# Patient Record
Sex: MALE | Race: WHITE | HISPANIC OR LATINO | URBAN - METROPOLITAN AREA
[De-identification: names, ages, dates, MRNs, and addresses within clinical notes are randomized per-mention and may not be internally consistent; named-entity substitution may affect disease eponyms.]

---

## 2018-03-11 ENCOUNTER — INPATIENT (INPATIENT)
Facility: HOSPITAL | Age: 43
LOS: 2 days | Discharge: HOME | DRG: 271 | End: 2018-03-14
Attending: STUDENT IN AN ORGANIZED HEALTH CARE EDUCATION/TRAINING PROGRAM | Admitting: INTERNAL MEDICINE
Payer: COMMERCIAL

## 2018-03-11 VITALS
OXYGEN SATURATION: 94 % | DIASTOLIC BLOOD PRESSURE: 63 MMHG | TEMPERATURE: 98 F | WEIGHT: 229.28 LBS | HEIGHT: 71 IN | HEART RATE: 74 BPM | RESPIRATION RATE: 17 BRPM | SYSTOLIC BLOOD PRESSURE: 99 MMHG

## 2018-03-11 DIAGNOSIS — R07.9 CHEST PAIN, UNSPECIFIED: ICD-10-CM

## 2018-03-11 PROCEDURE — 33967 INSERT I-AORT PERCUT DEVICE: CPT

## 2018-03-11 PROCEDURE — 93010 ELECTROCARDIOGRAM REPORT: CPT

## 2018-03-11 PROCEDURE — 92960 CARDIOVERSION ELECTRIC EXT: CPT

## 2018-03-11 PROCEDURE — 93458 L HRT ARTERY/VENTRICLE ANGIO: CPT | Mod: 26,59

## 2018-03-11 PROCEDURE — 76937 US GUIDE VASCULAR ACCESS: CPT | Mod: 26

## 2018-03-11 PROCEDURE — 92941 PRQ TRLML REVSC TOT OCCL AMI: CPT | Mod: LC

## 2018-03-11 PROCEDURE — 99152 MOD SED SAME PHYS/QHP 5/>YRS: CPT

## 2018-03-11 PROCEDURE — 92929: CPT | Mod: LC,NC

## 2018-03-11 RX ORDER — HEPARIN SODIUM 5000 [USP'U]/ML
INJECTION INTRAVENOUS; SUBCUTANEOUS
Qty: 25000 | Refills: 0 | Status: DISCONTINUED | OUTPATIENT
Start: 2018-03-11 | End: 2018-03-13

## 2018-03-11 RX ORDER — TICAGRELOR 90 MG/1
90 TABLET ORAL
Qty: 0 | Refills: 0 | Status: DISCONTINUED | OUTPATIENT
Start: 2018-03-11 | End: 2018-03-14

## 2018-03-11 RX ORDER — ASPIRIN/CALCIUM CARB/MAGNESIUM 324 MG
81 TABLET ORAL DAILY
Qty: 0 | Refills: 0 | Status: DISCONTINUED | OUTPATIENT
Start: 2018-03-11 | End: 2018-03-14

## 2018-03-11 RX ORDER — HEPARIN SODIUM 5000 [USP'U]/ML
4000 INJECTION INTRAVENOUS; SUBCUTANEOUS EVERY 6 HOURS
Qty: 0 | Refills: 0 | Status: DISCONTINUED | OUTPATIENT
Start: 2018-03-11 | End: 2018-03-13

## 2018-03-11 RX ORDER — ATORVASTATIN CALCIUM 80 MG/1
80 TABLET, FILM COATED ORAL AT BEDTIME
Qty: 0 | Refills: 0 | Status: DISCONTINUED | OUTPATIENT
Start: 2018-03-11 | End: 2018-03-14

## 2018-03-11 RX ADMIN — ATORVASTATIN CALCIUM 80 MILLIGRAM(S): 80 TABLET, FILM COATED ORAL at 23:39

## 2018-03-11 NOTE — H&P ADULT - NSHPREVIEWOFSYSTEMS_GEN_ALL_CORE
REVIEW OF SYSTEMS:    CONSTITUTIONAL: No weakness, fevers or chills  EYES/ENT: No visual changes;  No vertigo or throat pain   NECK: No pain or stiffness  RESPIRATORY: No cough, wheezing, hemoptysis; No shortness of breath  CARDIOVASCULAR: +1/10 CP. Denies palpitations  GASTROINTESTINAL: No abdominal or epigastric pain. No nausea, vomiting, or hematemesis; No diarrhea or constipation. No melena or hematochezia.  GENITOURINARY: No dysuria, frequency or hematuria  NEUROLOGICAL: No numbness or weakness  SKIN: No itching, rashes

## 2018-03-11 NOTE — H&P ADULT - FAMILY HISTORY
Father  Still living? Unknown  Family history of MI (myocardial infarction), Age at diagnosis: Age Unknown  Family history of diabetes mellitus, Age at diagnosis: Age Unknown

## 2018-03-11 NOTE — H&P ADULT - NSHPPHYSICALEXAM_GEN_ALL_CORE
General: WN/WD NAD.  Neurology: A&Ox3, nonfocal. Mentating well.   Respiratory: CTA B/L without wheezes, rales, rhonchi. Sating 96% on RA. Not tachypneic.  CV: RRR, S1S2, no murmurs, rubs or gallops. No JVD appreciated.   Abdominal: Soft, NT, ND +BS.   Extremities: No edema, + peripheral pulses  Lines: R groin IABP site soft without hematoma or bleeding. Distal extremity has palpable pulses and is warm to touch.

## 2018-03-11 NOTE — H&P ADULT - ASSESSMENT
42M PMH HLD and family hx of MI presented to OSH c/o 10/10 CP associated with diaphoresis and dyspnea, found to have SALLY in inferior leads with STD I, AVL, V1, V2. Pt was loaded with ASA and brilinta and transferred to [facility] for urgent PCI s/p ENOC x1 to pCirc and ENOC x1 to SHASHI assisted with IABP placement. During cath, pt went into hemodynamically unstable rhythm SVT with aberrancy vs VT despite lidocaine and amiodarone administration prompting sedation and cardioversion with return to sinus rhythm. LVEDP was 26. Pt admitted to CCU for continued management of STEMI.     #Neuro: A&O x3. No active issues.     #Resp: Sating well on RA. Lungs are CTA. CXR in AM. Monitor respiratory status.    #Cardiac:   1. STEMI:   -s/p PCI w/ IABP placement. IABP is 1:1 augmenting 90s w/ mean 70s  -EF was 35% LVEDP 26  -heparin gtt while IABP in place and daily CXRs to confirm placement  -DAPT, high intensity statin  -Holding off on BB for now in acute setting  -Holding off on ACE for now due to low augmenting pressure  -s/p cardioverson now in sinus rhythm and hemodynamically stable  -admission labs, A1C, lipid panel, TSH, pro BNP  -trend CE and EKGs  -Bedrest  -Eventual TTE  -Keep Mg>2 and K>4    #GI: No active issues. DASH diet.     #PPX: on heparin gtt. 42M PMH HLD and family hx of MI presented to OSH c/o 10/10 CP associated with diaphoresis and dyspnea, found to have SALLY in inferior leads with STD I, AVL, V1, V2. Pt was loaded with ASA and brilinta and transferred to [facility] for urgent PCI s/p ENOC x1 to pCirc and ENOC x1 to SHASHI assisted with IABP placement. During cath, pt went into hemodynamically unstable rhythm SVT with aberrancy vs VT despite lidocaine and amiodarone administration prompting sedation and cardioversion with return to sinus rhythm. LVEDP was 26. Pt admitted to CCU for continued management of STEMI.     #Neuro: A&O x3. No active issues.     #Resp: Sating well on RA. Lungs are CTA. CXR in AM. Monitor respiratory status.    #Cardiac:   1. STEMI:   -s/p PCI w/ IABP placement. IABP is 1:1 augmenting 90s w/ mean 70s  -EF was 35% LVEDP 26  -heparin gtt while IABP in place and daily CXRs to confirm placement  -DAPT, high intensity statin  -Holding off on BB for now in acute setting  -Holding off on ACE for now due to low augmenting pressure  -s/p cardioverson now in sinus rhythm and hemodynamically stable  -admission labs, A1C, lipid panel, TSH, pro BNP  -trend CE and EKGs  -Bedrest  -Eventual TTE  -Keep Mg>2 and K>4    #GI: No active issues. DASH diet. Transaminitis in the setting of MI, will trend.     #Renal: No active issues. Cr wnl.     #ID: No active issues, afebrile.     #PPX: on heparin gtt.

## 2018-03-11 NOTE — H&P ADULT - HISTORY OF PRESENT ILLNESS
Pt is a 41 yo M with PMH HLD(not on meds and has not seen a doctor in 5 years), family hx of MI, who presented to OSH c/o substernal 10/10 squeezing chest pain that began 1 hr prior to presentation. Pain radiated to left arm and was associated with diaphoresis and dyspnea. Pt endorses a few weeks of similar CP episodes precipitated by exertion at work and relieved with rest. Pt did not seek medical attention at that time. In ED at OSH EKG w/ SALLY inferior leads with STD I, AVL, V1, V2. Pt loaded with ASA and brilinta and transferred to [facility] for urgent PCI, cath lab activated. In cath lab pt loaded w/ heparin and found to have pCirc 100% DESx1, SHASHI 80% DESx1, EF 35% assisted with IABP placement in right groin due to slow flow in the SHASHI distally. During cath, pt went into hemodynamically unstable rhythm SVT with aberrancy vs VT despite lidocaine and amiodarone administration prompting sedation and cardioversion with return to sinus rhythm. LVEDP was 26. Pt admitted to CCU for continued management of STEMI.

## 2018-03-12 LAB
ALBUMIN SERPL ELPH-MCNC: 4 G/DL — SIGNIFICANT CHANGE UP (ref 3.3–5)
ALBUMIN SERPL ELPH-MCNC: 4.1 G/DL — SIGNIFICANT CHANGE UP (ref 3.3–5)
ALP SERPL-CCNC: 70 U/L — SIGNIFICANT CHANGE UP (ref 40–120)
ALP SERPL-CCNC: 72 U/L — SIGNIFICANT CHANGE UP (ref 40–120)
ALP SERPL-CCNC: 73 U/L — SIGNIFICANT CHANGE UP (ref 40–120)
ALP SERPL-CCNC: 76 U/L — SIGNIFICANT CHANGE UP (ref 40–120)
ALT FLD-CCNC: 103 U/L RC — HIGH (ref 10–45)
ALT FLD-CCNC: 88 U/L RC — HIGH (ref 10–45)
ALT FLD-CCNC: 89 U/L RC — HIGH (ref 10–45)
ALT FLD-CCNC: 98 U/L RC — HIGH (ref 10–45)
ANION GAP SERPL CALC-SCNC: 10 MMOL/L — SIGNIFICANT CHANGE UP (ref 5–17)
ANION GAP SERPL CALC-SCNC: 11 MMOL/L — SIGNIFICANT CHANGE UP (ref 5–17)
ANION GAP SERPL CALC-SCNC: 14 MMOL/L — SIGNIFICANT CHANGE UP (ref 5–17)
ANION GAP SERPL CALC-SCNC: 15 MMOL/L — SIGNIFICANT CHANGE UP (ref 5–17)
APTT BLD: 48.9 SEC — HIGH (ref 27.5–37.4)
APTT BLD: 49.5 SEC — HIGH (ref 27.5–37.4)
APTT BLD: > 200 SEC (ref 27.5–37.4)
AST SERPL-CCNC: 288 U/L — HIGH (ref 10–40)
AST SERPL-CCNC: 397 U/L — HIGH (ref 10–40)
AST SERPL-CCNC: 515 U/L — HIGH (ref 10–40)
AST SERPL-CCNC: 524 U/L — HIGH (ref 10–40)
BASOPHILS # BLD AUTO: 0 K/UL — SIGNIFICANT CHANGE UP (ref 0–0.2)
BASOPHILS NFR BLD AUTO: 0.2 % — SIGNIFICANT CHANGE UP (ref 0–2)
BASOPHILS NFR BLD AUTO: 0.3 % — SIGNIFICANT CHANGE UP (ref 0–2)
BASOPHILS NFR BLD AUTO: 0.3 % — SIGNIFICANT CHANGE UP (ref 0–2)
BASOPHILS NFR BLD AUTO: 0.4 % — SIGNIFICANT CHANGE UP (ref 0–2)
BILIRUB SERPL-MCNC: 1 MG/DL — SIGNIFICANT CHANGE UP (ref 0.2–1.2)
BILIRUB SERPL-MCNC: 1.3 MG/DL — HIGH (ref 0.2–1.2)
BILIRUB SERPL-MCNC: 1.4 MG/DL — HIGH (ref 0.2–1.2)
BILIRUB SERPL-MCNC: 1.5 MG/DL — HIGH (ref 0.2–1.2)
BLD GP AB SCN SERPL QL: NEGATIVE — SIGNIFICANT CHANGE UP
BUN SERPL-MCNC: 14 MG/DL — SIGNIFICANT CHANGE UP (ref 7–23)
BUN SERPL-MCNC: 15 MG/DL — SIGNIFICANT CHANGE UP (ref 7–23)
BUN SERPL-MCNC: 16 MG/DL — SIGNIFICANT CHANGE UP (ref 7–23)
BUN SERPL-MCNC: 16 MG/DL — SIGNIFICANT CHANGE UP (ref 7–23)
CALCIUM SERPL-MCNC: 8.8 MG/DL — SIGNIFICANT CHANGE UP (ref 8.4–10.5)
CALCIUM SERPL-MCNC: 9 MG/DL — SIGNIFICANT CHANGE UP (ref 8.4–10.5)
CALCIUM SERPL-MCNC: 9 MG/DL — SIGNIFICANT CHANGE UP (ref 8.4–10.5)
CALCIUM SERPL-MCNC: 9.1 MG/DL — SIGNIFICANT CHANGE UP (ref 8.4–10.5)
CHLORIDE SERPL-SCNC: 101 MMOL/L — SIGNIFICANT CHANGE UP (ref 96–108)
CHLORIDE SERPL-SCNC: 102 MMOL/L — SIGNIFICANT CHANGE UP (ref 96–108)
CHLORIDE SERPL-SCNC: 102 MMOL/L — SIGNIFICANT CHANGE UP (ref 96–108)
CHLORIDE SERPL-SCNC: 103 MMOL/L — SIGNIFICANT CHANGE UP (ref 96–108)
CHOLEST SERPL-MCNC: 198 MG/DL — SIGNIFICANT CHANGE UP (ref 10–199)
CK MB BLD-MCNC: 2.9 % — SIGNIFICANT CHANGE UP (ref 0–3.5)
CK MB BLD-MCNC: 4.1 % — HIGH (ref 0–3.5)
CK MB CFR SERPL CALC: 110.9 NG/ML — HIGH (ref 0–6.7)
CK MB CFR SERPL CALC: 162.5 NG/ML — HIGH (ref 0–6.7)
CK MB CFR SERPL CALC: 186.6 NG/ML — HIGH (ref 0–6.7)
CK SERPL-CCNC: 3545 U/L — HIGH (ref 30–200)
CK SERPL-CCNC: 4606 U/L — HIGH (ref 30–200)
CK SERPL-CCNC: 5545 U/L — HIGH (ref 30–200)
CO2 SERPL-SCNC: 23 MMOL/L — SIGNIFICANT CHANGE UP (ref 22–31)
CO2 SERPL-SCNC: 23 MMOL/L — SIGNIFICANT CHANGE UP (ref 22–31)
CO2 SERPL-SCNC: 25 MMOL/L — SIGNIFICANT CHANGE UP (ref 22–31)
CO2 SERPL-SCNC: 25 MMOL/L — SIGNIFICANT CHANGE UP (ref 22–31)
CREAT SERPL-MCNC: 0.82 MG/DL — SIGNIFICANT CHANGE UP (ref 0.5–1.3)
CREAT SERPL-MCNC: 0.83 MG/DL — SIGNIFICANT CHANGE UP (ref 0.5–1.3)
CREAT SERPL-MCNC: 0.87 MG/DL — SIGNIFICANT CHANGE UP (ref 0.5–1.3)
CREAT SERPL-MCNC: 0.95 MG/DL — SIGNIFICANT CHANGE UP (ref 0.5–1.3)
EOSINOPHIL # BLD AUTO: 0 K/UL — SIGNIFICANT CHANGE UP (ref 0–0.5)
EOSINOPHIL NFR BLD AUTO: 0.1 % — SIGNIFICANT CHANGE UP (ref 0–6)
EOSINOPHIL NFR BLD AUTO: 0.1 % — SIGNIFICANT CHANGE UP (ref 0–6)
EOSINOPHIL NFR BLD AUTO: 0.2 % — SIGNIFICANT CHANGE UP (ref 0–6)
EOSINOPHIL NFR BLD AUTO: 0.5 % — SIGNIFICANT CHANGE UP (ref 0–6)
GLUCOSE SERPL-MCNC: 117 MG/DL — HIGH (ref 70–99)
GLUCOSE SERPL-MCNC: 128 MG/DL — HIGH (ref 70–99)
GLUCOSE SERPL-MCNC: 129 MG/DL — HIGH (ref 70–99)
GLUCOSE SERPL-MCNC: 155 MG/DL — HIGH (ref 70–99)
HBA1C BLD-MCNC: 5.6 % — SIGNIFICANT CHANGE UP (ref 4–5.6)
HCT VFR BLD CALC: 37.8 % — LOW (ref 39–50)
HCT VFR BLD CALC: 38.2 % — LOW (ref 39–50)
HCT VFR BLD CALC: 39.1 % — SIGNIFICANT CHANGE UP (ref 39–50)
HCT VFR BLD CALC: 39.9 % — SIGNIFICANT CHANGE UP (ref 39–50)
HDLC SERPL-MCNC: 40 MG/DL — SIGNIFICANT CHANGE UP (ref 40–125)
HGB BLD-MCNC: 13 G/DL — SIGNIFICANT CHANGE UP (ref 13–17)
HGB BLD-MCNC: 13.1 G/DL — SIGNIFICANT CHANGE UP (ref 13–17)
HGB BLD-MCNC: 13.2 G/DL — SIGNIFICANT CHANGE UP (ref 13–17)
HGB BLD-MCNC: 13.2 G/DL — SIGNIFICANT CHANGE UP (ref 13–17)
INR BLD: 1.02 RATIO — SIGNIFICANT CHANGE UP (ref 0.88–1.16)
INR BLD: 1.09 RATIO — SIGNIFICANT CHANGE UP (ref 0.88–1.16)
LIPID PNL WITH DIRECT LDL SERPL: 136 MG/DL — HIGH
LYMPHOCYTES # BLD AUTO: 1.3 K/UL — SIGNIFICANT CHANGE UP (ref 1–3.3)
LYMPHOCYTES # BLD AUTO: 1.6 K/UL — SIGNIFICANT CHANGE UP (ref 1–3.3)
LYMPHOCYTES # BLD AUTO: 1.9 K/UL — SIGNIFICANT CHANGE UP (ref 1–3.3)
LYMPHOCYTES # BLD AUTO: 10.6 % — LOW (ref 13–44)
LYMPHOCYTES # BLD AUTO: 12 % — LOW (ref 13–44)
LYMPHOCYTES # BLD AUTO: 17.3 % — SIGNIFICANT CHANGE UP (ref 13–44)
LYMPHOCYTES # BLD AUTO: 19.6 % — SIGNIFICANT CHANGE UP (ref 13–44)
LYMPHOCYTES # BLD AUTO: 2.2 K/UL — SIGNIFICANT CHANGE UP (ref 1–3.3)
MAGNESIUM SERPL-MCNC: 1.9 MG/DL — SIGNIFICANT CHANGE UP (ref 1.6–2.6)
MAGNESIUM SERPL-MCNC: 2.2 MG/DL — SIGNIFICANT CHANGE UP (ref 1.6–2.6)
MCHC RBC-ENTMCNC: 28.5 PG — SIGNIFICANT CHANGE UP (ref 27–34)
MCHC RBC-ENTMCNC: 29.6 PG — SIGNIFICANT CHANGE UP (ref 27–34)
MCHC RBC-ENTMCNC: 30 PG — SIGNIFICANT CHANGE UP (ref 27–34)
MCHC RBC-ENTMCNC: 30.1 PG — SIGNIFICANT CHANGE UP (ref 27–34)
MCHC RBC-ENTMCNC: 32.9 GM/DL — SIGNIFICANT CHANGE UP (ref 32–36)
MCHC RBC-ENTMCNC: 33.8 GM/DL — SIGNIFICANT CHANGE UP (ref 32–36)
MCHC RBC-ENTMCNC: 34.4 GM/DL — SIGNIFICANT CHANGE UP (ref 32–36)
MCHC RBC-ENTMCNC: 34.7 GM/DL — SIGNIFICANT CHANGE UP (ref 32–36)
MCV RBC AUTO: 86.7 FL — SIGNIFICANT CHANGE UP (ref 80–100)
MCV RBC AUTO: 86.8 FL — SIGNIFICANT CHANGE UP (ref 80–100)
MCV RBC AUTO: 87.3 FL — SIGNIFICANT CHANGE UP (ref 80–100)
MCV RBC AUTO: 87.3 FL — SIGNIFICANT CHANGE UP (ref 80–100)
MONOCYTES # BLD AUTO: 0.3 K/UL — SIGNIFICANT CHANGE UP (ref 0–0.9)
MONOCYTES # BLD AUTO: 0.4 K/UL — SIGNIFICANT CHANGE UP (ref 0–0.9)
MONOCYTES # BLD AUTO: 0.8 K/UL — SIGNIFICANT CHANGE UP (ref 0–0.9)
MONOCYTES # BLD AUTO: 0.9 K/UL — SIGNIFICANT CHANGE UP (ref 0–0.9)
MONOCYTES NFR BLD AUTO: 3.2 % — SIGNIFICANT CHANGE UP (ref 2–14)
MONOCYTES NFR BLD AUTO: 3.7 % — SIGNIFICANT CHANGE UP (ref 2–14)
MONOCYTES NFR BLD AUTO: 5.9 % — SIGNIFICANT CHANGE UP (ref 2–14)
MONOCYTES NFR BLD AUTO: 7.2 % — SIGNIFICANT CHANGE UP (ref 2–14)
NEUTROPHILS # BLD AUTO: 10.1 K/UL — HIGH (ref 1.8–7.4)
NEUTROPHILS # BLD AUTO: 11.3 K/UL — HIGH (ref 1.8–7.4)
NEUTROPHILS # BLD AUTO: 7.3 K/UL — SIGNIFICANT CHANGE UP (ref 1.8–7.4)
NEUTROPHILS # BLD AUTO: 9.5 K/UL — HIGH (ref 1.8–7.4)
NEUTROPHILS NFR BLD AUTO: 74.9 % — SIGNIFICANT CHANGE UP (ref 43–77)
NEUTROPHILS NFR BLD AUTO: 76.3 % — SIGNIFICANT CHANGE UP (ref 43–77)
NEUTROPHILS NFR BLD AUTO: 81.8 % — HIGH (ref 43–77)
NEUTROPHILS NFR BLD AUTO: 85.2 % — HIGH (ref 43–77)
PHOSPHATE SERPL-MCNC: 1.9 MG/DL — LOW (ref 2.5–4.5)
PHOSPHATE SERPL-MCNC: 2 MG/DL — LOW (ref 2.5–4.5)
PHOSPHATE SERPL-MCNC: 2.6 MG/DL — SIGNIFICANT CHANGE UP (ref 2.5–4.5)
PHOSPHATE SERPL-MCNC: 2.9 MG/DL — SIGNIFICANT CHANGE UP (ref 2.5–4.5)
PLATELET # BLD AUTO: 150 K/UL — SIGNIFICANT CHANGE UP (ref 150–400)
PLATELET # BLD AUTO: 165 K/UL — SIGNIFICANT CHANGE UP (ref 150–400)
PLATELET # BLD AUTO: 168 K/UL — SIGNIFICANT CHANGE UP (ref 150–400)
PLATELET # BLD AUTO: 178 K/UL — SIGNIFICANT CHANGE UP (ref 150–400)
POTASSIUM SERPL-MCNC: 3.7 MMOL/L — SIGNIFICANT CHANGE UP (ref 3.5–5.3)
POTASSIUM SERPL-MCNC: 3.7 MMOL/L — SIGNIFICANT CHANGE UP (ref 3.5–5.3)
POTASSIUM SERPL-MCNC: 3.8 MMOL/L — SIGNIFICANT CHANGE UP (ref 3.5–5.3)
POTASSIUM SERPL-MCNC: 3.9 MMOL/L — SIGNIFICANT CHANGE UP (ref 3.5–5.3)
POTASSIUM SERPL-SCNC: 3.7 MMOL/L — SIGNIFICANT CHANGE UP (ref 3.5–5.3)
POTASSIUM SERPL-SCNC: 3.7 MMOL/L — SIGNIFICANT CHANGE UP (ref 3.5–5.3)
POTASSIUM SERPL-SCNC: 3.8 MMOL/L — SIGNIFICANT CHANGE UP (ref 3.5–5.3)
POTASSIUM SERPL-SCNC: 3.9 MMOL/L — SIGNIFICANT CHANGE UP (ref 3.5–5.3)
PROT SERPL-MCNC: 7.2 G/DL — SIGNIFICANT CHANGE UP (ref 6–8.3)
PROT SERPL-MCNC: 7.5 G/DL — SIGNIFICANT CHANGE UP (ref 6–8.3)
PROT SERPL-MCNC: 7.6 G/DL — SIGNIFICANT CHANGE UP (ref 6–8.3)
PROT SERPL-MCNC: 7.6 G/DL — SIGNIFICANT CHANGE UP (ref 6–8.3)
PROTHROM AB SERPL-ACNC: 11 SEC — SIGNIFICANT CHANGE UP (ref 9.8–12.7)
PROTHROM AB SERPL-ACNC: 11.8 SEC — SIGNIFICANT CHANGE UP (ref 9.8–12.7)
RBC # BLD: 4.33 M/UL — SIGNIFICANT CHANGE UP (ref 4.2–5.8)
RBC # BLD: 4.4 M/UL — SIGNIFICANT CHANGE UP (ref 4.2–5.8)
RBC # BLD: 4.48 M/UL — SIGNIFICANT CHANGE UP (ref 4.2–5.8)
RBC # BLD: 4.6 M/UL — SIGNIFICANT CHANGE UP (ref 4.2–5.8)
RBC # FLD: 12.7 % — SIGNIFICANT CHANGE UP (ref 10.3–14.5)
RBC # FLD: 12.7 % — SIGNIFICANT CHANGE UP (ref 10.3–14.5)
RBC # FLD: 12.8 % — SIGNIFICANT CHANGE UP (ref 10.3–14.5)
RBC # FLD: 12.8 % — SIGNIFICANT CHANGE UP (ref 10.3–14.5)
RH IG SCN BLD-IMP: POSITIVE — SIGNIFICANT CHANGE UP
SODIUM SERPL-SCNC: 138 MMOL/L — SIGNIFICANT CHANGE UP (ref 135–145)
SODIUM SERPL-SCNC: 138 MMOL/L — SIGNIFICANT CHANGE UP (ref 135–145)
SODIUM SERPL-SCNC: 139 MMOL/L — SIGNIFICANT CHANGE UP (ref 135–145)
SODIUM SERPL-SCNC: 139 MMOL/L — SIGNIFICANT CHANGE UP (ref 135–145)
TOTAL CHOLESTEROL/HDL RATIO MEASUREMENT: 5 RATIO — SIGNIFICANT CHANGE UP (ref 3.4–9.6)
TRIGL SERPL-MCNC: 112 MG/DL — SIGNIFICANT CHANGE UP (ref 10–149)
TROPONIN T SERPL-MCNC: 15.67 NG/ML — HIGH (ref 0–0.06)
TROPONIN T SERPL-MCNC: 5.85 NG/ML — HIGH (ref 0–0.06)
TROPONIN T SERPL-MCNC: 6.69 NG/ML — HIGH (ref 0–0.06)
TSH SERPL-MCNC: 1.06 UIU/ML — SIGNIFICANT CHANGE UP (ref 0.27–4.2)
WBC # BLD: 11.8 K/UL — HIGH (ref 3.8–10.5)
WBC # BLD: 12.6 K/UL — HIGH (ref 3.8–10.5)
WBC # BLD: 13.8 K/UL — HIGH (ref 3.8–10.5)
WBC # BLD: 9.6 K/UL — SIGNIFICANT CHANGE UP (ref 3.8–10.5)
WBC # FLD AUTO: 11.8 K/UL — HIGH (ref 3.8–10.5)
WBC # FLD AUTO: 12.6 K/UL — HIGH (ref 3.8–10.5)
WBC # FLD AUTO: 13.8 K/UL — HIGH (ref 3.8–10.5)
WBC # FLD AUTO: 9.6 K/UL — SIGNIFICANT CHANGE UP (ref 3.8–10.5)

## 2018-03-12 PROCEDURE — 93306 TTE W/DOPPLER COMPLETE: CPT | Mod: 26

## 2018-03-12 PROCEDURE — 99291 CRITICAL CARE FIRST HOUR: CPT

## 2018-03-12 PROCEDURE — 93010 ELECTROCARDIOGRAM REPORT: CPT

## 2018-03-12 PROCEDURE — 71045 X-RAY EXAM CHEST 1 VIEW: CPT | Mod: 26

## 2018-03-12 RX ORDER — METOPROLOL TARTRATE 50 MG
12.5 TABLET ORAL ONCE
Qty: 0 | Refills: 0 | Status: COMPLETED | OUTPATIENT
Start: 2018-03-12 | End: 2018-03-12

## 2018-03-12 RX ORDER — POTASSIUM CHLORIDE 20 MEQ
20 PACKET (EA) ORAL ONCE
Qty: 0 | Refills: 0 | Status: COMPLETED | OUTPATIENT
Start: 2018-03-12 | End: 2018-03-12

## 2018-03-12 RX ORDER — MAGNESIUM SULFATE 500 MG/ML
1 VIAL (ML) INJECTION ONCE
Qty: 0 | Refills: 0 | Status: COMPLETED | OUTPATIENT
Start: 2018-03-12 | End: 2018-03-12

## 2018-03-12 RX ORDER — LANOLIN ALCOHOL/MO/W.PET/CERES
1 CREAM (GRAM) TOPICAL AT BEDTIME
Qty: 0 | Refills: 0 | Status: DISCONTINUED | OUTPATIENT
Start: 2018-03-12 | End: 2018-03-14

## 2018-03-12 RX ORDER — FUROSEMIDE 40 MG
20 TABLET ORAL ONCE
Qty: 0 | Refills: 0 | Status: COMPLETED | OUTPATIENT
Start: 2018-03-12 | End: 2018-03-12

## 2018-03-12 RX ORDER — POTASSIUM CHLORIDE 20 MEQ
40 PACKET (EA) ORAL ONCE
Qty: 0 | Refills: 0 | Status: COMPLETED | OUTPATIENT
Start: 2018-03-12 | End: 2018-03-12

## 2018-03-12 RX ORDER — METOPROLOL TARTRATE 50 MG
12.5 TABLET ORAL
Qty: 0 | Refills: 0 | Status: DISCONTINUED | OUTPATIENT
Start: 2018-03-12 | End: 2018-03-13

## 2018-03-12 RX ORDER — ACETAMINOPHEN 500 MG
500 TABLET ORAL EVERY 6 HOURS
Qty: 0 | Refills: 0 | Status: DISCONTINUED | OUTPATIENT
Start: 2018-03-12 | End: 2018-03-14

## 2018-03-12 RX ORDER — SODIUM,POTASSIUM PHOSPHATES 278-250MG
1 POWDER IN PACKET (EA) ORAL EVERY 4 HOURS
Qty: 0 | Refills: 0 | Status: COMPLETED | OUTPATIENT
Start: 2018-03-12 | End: 2018-03-13

## 2018-03-12 RX ADMIN — Medication 1 TABLET(S): at 22:53

## 2018-03-12 RX ADMIN — TICAGRELOR 90 MILLIGRAM(S): 90 TABLET ORAL at 17:15

## 2018-03-12 RX ADMIN — Medication 500 MILLIGRAM(S): at 04:11

## 2018-03-12 RX ADMIN — Medication 20 MILLIEQUIVALENT(S): at 11:45

## 2018-03-12 RX ADMIN — HEPARIN SODIUM 1300 UNIT(S)/HR: 5000 INJECTION INTRAVENOUS; SUBCUTANEOUS at 08:31

## 2018-03-12 RX ADMIN — ATORVASTATIN CALCIUM 80 MILLIGRAM(S): 80 TABLET, FILM COATED ORAL at 21:26

## 2018-03-12 RX ADMIN — Medication 20 MILLIEQUIVALENT(S): at 00:54

## 2018-03-12 RX ADMIN — HEPARIN SODIUM 4000 UNIT(S): 5000 INJECTION INTRAVENOUS; SUBCUTANEOUS at 08:32

## 2018-03-12 RX ADMIN — Medication 12.5 MILLIGRAM(S): at 11:44

## 2018-03-12 RX ADMIN — Medication 100 GRAM(S): at 00:54

## 2018-03-12 RX ADMIN — Medication 500 MILLIGRAM(S): at 03:33

## 2018-03-12 RX ADMIN — Medication 40 MILLIEQUIVALENT(S): at 17:15

## 2018-03-12 RX ADMIN — TICAGRELOR 90 MILLIGRAM(S): 90 TABLET ORAL at 06:14

## 2018-03-12 RX ADMIN — Medication 12.5 MILLIGRAM(S): at 21:26

## 2018-03-12 RX ADMIN — Medication 500 MILLIGRAM(S): at 21:25

## 2018-03-12 RX ADMIN — HEPARIN SODIUM 1500 UNIT(S)/HR: 5000 INJECTION INTRAVENOUS; SUBCUTANEOUS at 15:59

## 2018-03-12 RX ADMIN — Medication 81 MILLIGRAM(S): at 11:45

## 2018-03-12 RX ADMIN — HEPARIN SODIUM 1000 UNIT(S)/HR: 5000 INJECTION INTRAVENOUS; SUBCUTANEOUS at 01:00

## 2018-03-12 RX ADMIN — Medication 20 MILLIGRAM(S): at 11:44

## 2018-03-12 RX ADMIN — HEPARIN SODIUM 1700 UNIT(S)/HR: 5000 INJECTION INTRAVENOUS; SUBCUTANEOUS at 22:52

## 2018-03-12 RX ADMIN — Medication 500 MILLIGRAM(S): at 22:05

## 2018-03-12 NOTE — CONSULT NOTE ADULT - SUBJECTIVE AND OBJECTIVE BOX
Date of Admission:    CHIEF COMPLAINT:    HISTORY OF PRESENT ILLNESS:      Allergies    No Known Allergies    Intolerances    	    MEDICATIONS:  aspirin enteric coated 81 milliGRAM(s) Oral daily  heparin  Infusion.  Unit(s)/Hr IV Continuous <Continuous>  heparin  Injectable 4000 Unit(s) IV Push every 6 hours PRN  ticagrelor 90 milliGRAM(s) Oral two times a day            atorvastatin 80 milliGRAM(s) Oral at bedtime        PAST MEDICAL & SURGICAL HISTORY:  Hyperlipidemia  No significant past surgical history      FAMILY HISTORY:  Family history of diabetes mellitus (Father)  Family history of MI (myocardial infarction) (Father)      SOCIAL HISTORY:    [ ] Non-smoker  [ ] Smoker  [ ] Alcohol      REVIEW OF SYSTEMS:  General: no fatigue/malaise, weight loss/gain.  Skin: no rashes.  Ophthalmologic: no blurred vision, no loss of vision. 	  ENT: no sore throat, rhinorrhea, sinus congestion.  Respiratory: no SOB, cough or wheeze.  Gastrointestinal:  no N/V/D, no melena/hematemesis/hematochezia.  Genitourinary: no dysuria/hesitancy or hematuria.  Musculoskeletal: no myalgias or arthralgias.  Neurological: no changes in vision or hearing, no lightheadedness/dizziness, no syncope/near syncope	  Psychiatric: no unusual stress/anxiety.   Hematology/Lymphatics: no unusual bleeding, bruising and no lymphadenopathy.  Endocrine: no unusual thirst.   All others negative except as stated above and in HPI.    PHYSICAL EXAM:  T(C): 36.8 (03-11-18 @ 22:39), Max: 36.8 (03-11-18 @ 22:39)  HR: 78 (03-12-18 @ 02:00) (64 - 78)  BP: 99/63 (03-11-18 @ 22:39) (99/63 - 99/63)  RR: 17 (03-12-18 @ 02:00) (16 - 19)  SpO2: 98% (03-12-18 @ 02:00) (92% - 98%)  Wt(kg): --  I&O's Summary    11 Mar 2018 07:01  -  12 Mar 2018 02:55  --------------------------------------------------------  IN: 150 mL / OUT: 400 mL / NET: -250 mL        Appearance: Normal	  HEENT:   Normal oral mucosa, PERRL, EOMI	  Lymphatic: No lymphadenopathy  Cardiovascular: Normal S1 S2, No JVD, No murmurs, No edema  Respiratory: Lungs clear to auscultation	  Psychiatry: A & O x 3, Mood & affect appropriate  Gastrointestinal:  Soft, Non-tender, + BS	  Skin: No rashes, No ecchymoses, No cyanosis	  Neurologic: Non-focal  Extremities: Normal range of motion, No clubbing, cyanosis or edema  Vascular: Peripheral pulses palpable 2+ bilaterally        LABS:	 	    CBC Full  -  ( 11 Mar 2018 23:49 )  WBC Count : 9.6 K/uL  Hemoglobin : 13.2 g/dL  Hematocrit : 39.1 %  Platelet Count - Automated : 178 K/uL  Mean Cell Volume : 87.3 fl  Mean Cell Hemoglobin : 29.6 pg  Mean Cell Hemoglobin Concentration : 33.8 gm/dL  Auto Neutrophil # : 7.3 K/uL  Auto Lymphocyte # : 1.9 K/uL  Auto Monocyte # : 0.3 K/uL  Auto Eosinophil # : 0.0 K/uL  Auto Basophil # : 0.0 K/uL  Auto Neutrophil % : 76.3 %  Auto Lymphocyte % : 19.6 %  Auto Monocyte % : 3.2 %  Auto Eosinophil % : 0.5 %  Auto Basophil % : 0.4 %    03-11    139  |  101  |  16  ----------------------------<  129<H>  3.8   |  23  |  0.95    Ca    9.1      11 Mar 2018 23:53  Phos  2.0     03-11  Mg     1.9     03-11    TPro  7.2  /  Alb  4.0  /  TBili  1.0  /  DBili  x   /  AST  515<H>  /  ALT  89<H>  /  AlkPhos  76  03-11      proBNP: Serum Pro-Brain Natriuretic Peptide: 113 pg/mL (03-11 @ 23:53)    Lipid Profile:   HgA1c:   TSH:       CARDIAC MARKERS:            TELEMETRY: 	    ECG:  	  RADIOLOGY:  OTHER: 	    PREVIOUS DIAGNOSTIC TESTING:    [ ] Echocardiogram:  [ ]  Catheterization:  [ ] Stress Test:  	  	  ASSESSMENT/PLAN: CHIEF COMPLAINT: Chest Pain    HISTORY OF PRESENT ILLNESS: The Pt is a 43 y/o man with h/o HLD who presented to OSH ED with c/o chest pain which he described as a pressure-like sensation which radiated to the left arm. EKG at OSH concerning for Inferior STEMI, and he was transferred to [facility] for emergent LHC. He was found to have an RCA (non-culprit) and underwent PCI to LCx and OM1. During intervention, he became tachycardic (SVT vs. VT), which did not resolve with Amio and Lido boluses, so he was sedated and cardioverted prior to leaving the cath lab. He was also given a dose of Integrellin, and an IABP was placed.       Allergies    No Known Allergies    Intolerances    	    MEDICATIONS:  aspirin enteric coated 81 milliGRAM(s) Oral daily  heparin  Infusion.  Unit(s)/Hr IV Continuous <Continuous>  heparin  Injectable 4000 Unit(s) IV Push every 6 hours PRN  ticagrelor 90 milliGRAM(s) Oral two times a day            atorvastatin 80 milliGRAM(s) Oral at bedtime        PAST MEDICAL & SURGICAL HISTORY:  Hyperlipidemia  No significant past surgical history      FAMILY HISTORY:  Family history of diabetes mellitus (Father)  Family history of MI (myocardial infarction) (Father)      SOCIAL HISTORY: Non-smoker    REVIEW OF SYSTEMS:  General: no fatigue/malaise, weight loss/gain.  Skin: no rashes.  Ophthalmologic: no blurred vision, no loss of vision. 	  ENT: no sore throat, rhinorrhea, sinus congestion.  Respiratory: no cough or wheeze.  Gastrointestinal: no melena/hematemesis/hematochezia.  Genitourinary: no dysuria/hesitancy or hematuria.  Musculoskeletal: no myalgias or arthralgias.  Neurological: no changes in vision or hearing, no lightheadedness/dizziness, no syncope/near syncope	  Psychiatric: no unusual stress/anxiety.   Hematology/Lymphatics: no unusual bleeding, bruising and no lymphadenopathy.  Endocrine: no unusual thirst.   All others negative except as stated above and in HPI.    PHYSICAL EXAM:  T(C): 36.8 (03-11-18 @ 22:39), Max: 36.8 (03-11-18 @ 22:39)  HR: 78 (03-12-18 @ 02:00) (64 - 78)  BP: 99/63 (03-11-18 @ 22:39) (99/63 - 99/63)  RR: 17 (03-12-18 @ 02:00) (16 - 19)  SpO2: 98% (03-12-18 @ 02:00) (92% - 98%)  Wt(kg): --  I&O's Summary    11 Mar 2018 07:01  -  12 Mar 2018 02:55  --------------------------------------------------------  IN: 150 mL / OUT: 400 mL / NET: -250 mL        Appearance: Normal	  HEENT:   Normal oral mucosa, PERRL, EOMI	  Cardiovascular: RRR  Respiratory: Lungs clear to auscultation	  Psychiatry: A & O x 3, Mood & affect appropriate  Gastrointestinal:  Soft, Non-tender, + BS	  Skin: No rashes, No ecchymoses, No cyanosis	  Neurologic: Non-focal  Extremities: Normal range of motion, No clubbing, cyanosis or edema        LABS:	 	    CBC Full  -  ( 11 Mar 2018 23:49 )  WBC Count : 9.6 K/uL  Hemoglobin : 13.2 g/dL  Hematocrit : 39.1 %  Platelet Count - Automated : 178 K/uL  Mean Cell Volume : 87.3 fl  Mean Cell Hemoglobin : 29.6 pg  Mean Cell Hemoglobin Concentration : 33.8 gm/dL  Auto Neutrophil # : 7.3 K/uL  Auto Lymphocyte # : 1.9 K/uL  Auto Monocyte # : 0.3 K/uL  Auto Eosinophil # : 0.0 K/uL  Auto Basophil # : 0.0 K/uL  Auto Neutrophil % : 76.3 %  Auto Lymphocyte % : 19.6 %  Auto Monocyte % : 3.2 %  Auto Eosinophil % : 0.5 %  Auto Basophil % : 0.4 %    03-11    139  |  101  |  16  ----------------------------<  129<H>  3.8   |  23  |  0.95    Ca    9.1      11 Mar 2018 23:53  Phos  2.0     03-11  Mg     1.9     03-11    TPro  7.2  /  Alb  4.0  /  TBili  1.0  /  DBili  x   /  AST  515<H>  /  ALT  89<H>  /  AlkPhos  76  03-11      proBNP: Serum Pro-Brain Natriuretic Peptide: 113 pg/mL (03-11 @ 23:53)    CARDIAC MARKERS:    Troponin T, Serum (03.11.18 @ 23:53)    Troponin T, Serum: 15.67 ng/mL    CKMB Mass Assay (03.11.18 @ 23:53)    CKMB Units: 162.5 ng/mL    CPK Mass Assay %: 2.9 %    Creatine Kinase, Serum (03.11.18 @ 23:53)    Creatine Kinase, Serum: 5545 U/L     ECG: Sinus Rhythm with Inferior ST Elevations   RADIOLOGY:  OTHER: 	    PREVIOUS DIAGNOSTIC TESTING:      Catheterization:    < from: Cardiac Cath Lab - Adult (03.11.18 @ 20:48) >  VENTRICLES: Analysis of regional contractile function demonstrated mild  anterolateral hypokinesis, mild apical hypokinesis, severe diaphragmatic  hypokinesis, andsevere posterobasal hypokinesis. EF estimated was 40 %.  CORONARY VESSELS: The coronary circulation is left dominant.  LM:   --  LM: The vessel was large sized. Angiography showed mild  atherosclerosis with no flow limiting lesions.  LAD:   --  Proximal LAD: Angiography showed mild atherosclerosis with no  flow limiting lesions.  --  Mid LAD: There was a 50 % stenosis.  --  Distal LAD: There was a 70 % stenosis.  --  D1: The vessel was medium sized. Angiography showed moderate  atherosclerosis.  --  D2: The vessel was medium sized. Angiography showed moderate  atherosclerosis.  CX:   --  Proximal circumflex: There was a 100 % stenosis.  --  Distal circumflex: There was a 80 % stenosis.  --  OM1: There was a 80 % stenosis.  RCA:   --  ProximalRCA: The vessel was medium sized. Angiography showed  mild atherosclerosis with no flow limiting lesions.  --  Mid RCA: There was a 70 % stenosis. Distal vessel angiography showed a  small vessel. Non-dominant vessel.  	  ASSESSMENT/PLAN: 43 y/o man with h/o HLD who presented to [facility] ED with Inferior STEMI    1) Inferior STEMI s/p ENOC to LCx/OM1 with IABP placement - stable, continue DAPT, statin, Heparin gtt while IABP in place, start BB/ACE-I as able   - check TTE CHIEF COMPLAINT: Chest Pain    HISTORY OF PRESENT ILLNESS: The Pt is a 43 y/o man with h/o HLD who presented to [facility]SH ED with c/o chest pain which he described as a pressure-like sensation which radiated to the left arm. EKG at OSH concerning for Inferior STEMI, and he was transferred to [facility] for emergent LHC. He was found to have an RCA (non-culprit) and underwent PCI to LCx and OM1. During intervention, he became tachycardic (SVT vs. VT), which did not resolve with Amio and Lido boluses, so he was sedated and cardioverted prior to leaving the cath lab. He was also given a dose of Integrellin, and an IABP was placed.       Allergies    No Known Allergies    Intolerances    	    MEDICATIONS:  aspirin enteric coated 81 milliGRAM(s) Oral daily  heparin  Infusion.  Unit(s)/Hr IV Continuous <Continuous>  heparin  Injectable 4000 Unit(s) IV Push every 6 hours PRN  ticagrelor 90 milliGRAM(s) Oral two times a day            atorvastatin 80 milliGRAM(s) Oral at bedtime        PAST MEDICAL & SURGICAL HISTORY:  Hyperlipidemia  No significant past surgical history      FAMILY HISTORY:  Family history of diabetes mellitus (Father)  Family history of MI (myocardial infarction) (Father)      SOCIAL HISTORY: Non-smoker    REVIEW OF SYSTEMS:  General: no fatigue/malaise, weight loss/gain.  Skin: no rashes.  Ophthalmologic: no blurred vision, no loss of vision. 	  ENT: no sore throat, rhinorrhea, sinus congestion.  Respiratory: no cough or wheeze.  Gastrointestinal: no melena/hematemesis/hematochezia.  Genitourinary: no dysuria/hesitancy or hematuria.  Musculoskeletal: no myalgias or arthralgias.  Neurological: no changes in vision or hearing, no lightheadedness/dizziness, no syncope/near syncope	  Psychiatric: no unusual stress/anxiety.   Hematology/Lymphatics: no unusual bleeding, bruising and no lymphadenopathy.  Endocrine: no unusual thirst.   All others negative except as stated above and in HPI.    PHYSICAL EXAM:  T(C): 36.8 (03-11-18 @ 22:39), Max: 36.8 (03-11-18 @ 22:39)  HR: 78 (03-12-18 @ 02:00) (64 - 78)  BP: 99/63 (03-11-18 @ 22:39) (99/63 - 99/63)  RR: 17 (03-12-18 @ 02:00) (16 - 19)  SpO2: 98% (03-12-18 @ 02:00) (92% - 98%)  Wt(kg): --  I&O's Summary    11 Mar 2018 07:01  -  12 Mar 2018 02:55  --------------------------------------------------------  IN: 150 mL / OUT: 400 mL / NET: -250 mL        Appearance: Normal	  HEENT:   Normal oral mucosa, PERRL, EOMI	  Cardiovascular: RRR  Respiratory: Lungs clear to auscultation	  Psychiatry: A & O x 3, Mood & affect appropriate  Gastrointestinal:  Soft, Non-tender, + BS	  Skin: No rashes, No ecchymoses, No cyanosis	  Neurologic: Non-focal  Extremities: Normal range of motion, No clubbing, cyanosis or edema        LABS:	 	    CBC Full  -  ( 11 Mar 2018 23:49 )  WBC Count : 9.6 K/uL  Hemoglobin : 13.2 g/dL  Hematocrit : 39.1 %  Platelet Count - Automated : 178 K/uL  Mean Cell Volume : 87.3 fl  Mean Cell Hemoglobin : 29.6 pg  Mean Cell Hemoglobin Concentration : 33.8 gm/dL  Auto Neutrophil # : 7.3 K/uL  Auto Lymphocyte # : 1.9 K/uL  Auto Monocyte # : 0.3 K/uL  Auto Eosinophil # : 0.0 K/uL  Auto Basophil # : 0.0 K/uL  Auto Neutrophil % : 76.3 %  Auto Lymphocyte % : 19.6 %  Auto Monocyte % : 3.2 %  Auto Eosinophil % : 0.5 %  Auto Basophil % : 0.4 %    03-11    139  |  101  |  16  ----------------------------<  129<H>  3.8   |  23  |  0.95    Ca    9.1      11 Mar 2018 23:53  Phos  2.0     03-11  Mg     1.9     03-11    TPro  7.2  /  Alb  4.0  /  TBili  1.0  /  DBili  x   /  AST  515<H>  /  ALT  89<H>  /  AlkPhos  76  03-11      proBNP: Serum Pro-Brain Natriuretic Peptide: 113 pg/mL (03-11 @ 23:53)    CARDIAC MARKERS:    Troponin T, Serum (03.11.18 @ 23:53)    Troponin T, Serum: 15.67 ng/mL    CKMB Mass Assay (03.11.18 @ 23:53)    CKMB Units: 162.5 ng/mL    CPK Mass Assay %: 2.9 %    Creatine Kinase, Serum (03.11.18 @ 23:53)    Creatine Kinase, Serum: 5545 U/L     ECG: Sinus Rhythm with Inferior ST Elevations     PREVIOUS DIAGNOSTIC TESTING:      Catheterization:    < from: Cardiac Cath Lab - Adult (03.11.18 @ 20:48) >  VENTRICLES: Analysis of regional contractile function demonstrated mild  anterolateral hypokinesis, mild apical hypokinesis, severe diaphragmatic  hypokinesis, andsevere posterobasal hypokinesis. EF estimated was 40 %.  CORONARY VESSELS: The coronary circulation is left dominant.  LM:   --  LM: The vessel was large sized. Angiography showed mild  atherosclerosis with no flow limiting lesions.  LAD:   --  Proximal LAD: Angiography showed mild atherosclerosis with no  flow limiting lesions.  --  Mid LAD: There was a 50 % stenosis.  --  Distal LAD: There was a 70 % stenosis.  --  D1: The vessel was medium sized. Angiography showed moderate  atherosclerosis.  --  D2: The vessel was medium sized. Angiography showed moderate  atherosclerosis.  CX:   --  Proximal circumflex: There was a 100 % stenosis.  --  Distal circumflex: There was a 80 % stenosis.  --  OM1: There was a 80 % stenosis.  RCA:   --  ProximalRCA: The vessel was medium sized. Angiography showed  mild atherosclerosis with no flow limiting lesions.  --  Mid RCA: There was a 70 % stenosis. Distal vessel angiography showed a  small vessel. Non-dominant vessel.  	  ASSESSMENT/PLAN: 43 y/o man with h/o HLD who presented to [facility] ED with Inferior STEMI    1) Inferior STEMI s/p ENOC to LCx/OM1 with IABP placement - stable, continue DAPT, statin, Heparin gtt while IABP in place, start BB/ACE-I as able   - check TTE

## 2018-03-12 NOTE — PROGRESS NOTE ADULT - SUBJECTIVE AND OBJECTIVE BOX
Patient is a 42y old  Male who presents with a chief complaint of Chest pain (11 Mar 2018 22:49)    Event	  HPI:  Pt is a 41 yo M with PMH HLD(not on meds and has not seen a doctor in 5 years), family hx of MI, who presented to OSH c/o substernal 10/10 squeezing chest pain that began 1 hr prior to presentation. Pain radiated to left arm and was associated with diaphoresis and dyspnea. Pt endorses a few weeks of similar CP episodes precipitated by exertion at work and relieved with rest. Pt did not seek medical attention at that time. In ED at OSH EKG w/ SALLY inferior leads with STD I, AVL, V1, V2. Pt loaded with ASA and brilinta and transferred to [facility] for urgent PCI, cath lab activated. In cath lab pt loaded w/ heparin and found to have pCirc 100% DESx1, SHASHI 80% DESx1, EF 35% assisted with IABP placement in right groin due to slow flow in the SHASHI distally. During cath, pt went into hemodynamically unstable rhythm SVT with aberrancy vs VT despite lidocaine and amiodarone administration prompting sedation and cardioversion with return to sinus rhythm. LVEDP was 26. Pt admitted to CCU for continued management of STEMI. (11 Mar 2018 22:49)    MEDICATIONS  (STANDING):  aspirin enteric coated 81 milliGRAM(s) Oral daily  atorvastatin 80 milliGRAM(s) Oral at bedtime  heparin  Infusion.  Unit(s)/Hr (10 mL/Hr) IV Continuous <Continuous>  ticagrelor 90 milliGRAM(s) Oral two times a day    MEDICATIONS  (PRN):  acetaminophen   Tablet. 500 milliGRAM(s) Oral every 6 hours PRN Moderate Pain (4 - 6)  heparin  Injectable 4000 Unit(s) IV Push every 6 hours PRN For aPTT less than 40      REVIEW OF SYSTEMS:  Otherwise, 10 point ROS done and otherwise negative.    PHYSICAL EXAM:  Vital Signs Last 24 Hrs  T(C): 36.9 (12 Mar 2018 06:00), Max: 36.9 (12 Mar 2018 06:00)  T(F): 98.4 (12 Mar 2018 06:00), Max: 98.4 (12 Mar 2018 06:00)  HR: 86 (12 Mar 2018 07:00) (64 - 86)  BP: 99/63 (11 Mar 2018 22:39) (99/63 - 99/63)  BP(mean): 73 (11 Mar 2018 22:39) (73 - 73)  RR: 19 (12 Mar 2018 07:00) (16 - 20)  SpO2: 99% (12 Mar 2018 07:00) (92% - 99%)  I&O's Summary    11 Mar 2018 07:01  -  12 Mar 2018 07:00  --------------------------------------------------------  IN: 250 mL / OUT: 800 mL / NET: -550 mL        Appearance: Normal	  HEENT:   Normal oral mucosa, PERRL, EOMI	  Lymphatic: No lymphadenopathy  Cardiovascular: Normal S1 S2, No JVD, No murmurs, No edema  Respiratory: Lungs clear to auscultation	  Psychiatry: A & O x 3, Mood & affect appropriate  Gastrointestinal:  Soft, Non-tender, + BS	  Skin: No rashes, No ecchymoses, No cyanosis	  Neurologic: Non-focal  Extremities: Normal range of motion, No clubbing, cyanosis or edema  Vascular: Peripheral pulses palpable 2+ bilaterally    LABS:	 	                        13.1   11.8  )-----------( 165      ( 12 Mar 2018 06:55 )             39.9     Auto Eosinophil # 0.0   / Auto Eosinophil % 0.1   / Auto Neutrophil # 10.1  / Auto Neutrophil % 85.2  / BANDS % x                            13.2   9.6   )-----------( 178      ( 11 Mar 2018 23:49 )             39.1     Auto Eosinophil # 0.0   / Auto Eosinophil % 0.5   / Auto Neutrophil # 7.3   / Auto Neutrophil % 76.3  / BANDS % x        INR: 1.02 ratio (03-12 @ 06:55)  INR: 1.09 ratio (03-11 @ 23:49)    03-11    139  |  101  |  16  ----------------------------<  129<H>  3.8   |  23  |  0.95    Ca    9.1      11 Mar 2018 23:53  Mg     1.9     03-11  Phos  2.0     03-11  TPro  7.2  /  Alb  4.0  /  TBili  1.0  /  DBili  x   /  AST  515<H>  /  ALT  89<H>  /  AlkPhos  76  03-11        proBNP: Serum Pro-Brain Natriuretic Peptide: 113 pg/mL (03-11 @ 23:53)    Lipid Profile:   HgA1c:   TSH:     CARDIAC MARKERS:   11 Mar 2018 23:53 Troponin 15.67 ng/mL / Creatine Kinase 5545 U/L /  CKMB 162.5 ng/mL / CPK Mass Assay % 2.9 %        TELEMETRY: 	    ECG:  	  RADIOLOGY:  OTHER: 	    PREVIOUS DIAGNOSTIC TESTING:    [ ] Echocardiogram:  [ ]  Catheterization:  [ ] Stress Test: Patient is a 42y old  Male who presents with a chief complaint of Chest pain (11 Mar 2018 22:49)    Subjective: no overnight events. patient reports some discomfort from the IABP but nothing that has progressed since the day prior. No CP, SOB or palpitations    MEDICATIONS  (STANDING):  aspirin enteric coated 81 milliGRAM(s) Oral daily  atorvastatin 80 milliGRAM(s) Oral at bedtime  heparin  Infusion.  Unit(s)/Hr (10 mL/Hr) IV Continuous <Continuous>  ticagrelor 90 milliGRAM(s) Oral two times a day    MEDICATIONS  (PRN):  acetaminophen   Tablet. 500 milliGRAM(s) Oral every 6 hours PRN Moderate Pain (4 - 6)  heparin  Injectable 4000 Unit(s) IV Push every 6 hours PRN For aPTT less than 40      REVIEW OF SYSTEMS:  Otherwise, 10 point ROS done and otherwise negative.    PHYSICAL EXAM:  Vital Signs Last 24 Hrs  T(C): 36.9 (12 Mar 2018 06:00), Max: 36.9 (12 Mar 2018 06:00)  T(F): 98.4 (12 Mar 2018 06:00), Max: 98.4 (12 Mar 2018 06:00)  HR: 86 (12 Mar 2018 07:00) (64 - 86)  BP: 99/63 (11 Mar 2018 22:39) (99/63 - 99/63)  BP(mean): 73 (11 Mar 2018 22:39) (73 - 73)  RR: 19 (12 Mar 2018 07:00) (16 - 20)  SpO2: 99% (12 Mar 2018 07:00) (92% - 99%)  I&O's Summary    11 Mar 2018 07:01  -  12 Mar 2018 07:00  --------------------------------------------------------  IN: 250 mL / OUT: 800 mL / NET: -550 mL    Appearance: Normal	  HEENT:   Normal oral mucosa, PERRL, EOMI	  Lymphatic: No lymphadenopathy  Cardiovascular: Normal S1 S2, No JVD, No murmurs, No edema  Respiratory: Lungs clear to auscultation, no wheezes, rales or rhonchi   Psychiatry: A & O x 3, Mood & affect appropriate  Gastrointestinal:  Soft, Non-tender, + BS	  Skin: No rashes, No ecchymoses, No cyanosis	  Neurologic: Non-focal  Extremities: Normal range of motion, No clubbing, cyanosis or edema  Vascular: Peripheral pulses palpable 2+ bilaterally    LABS:	 	                        13.1   11.8  )-----------( 165      ( 12 Mar 2018 06:55 )             39.9     Auto Eosinophil # 0.0   / Auto Eosinophil % 0.1   / Auto Neutrophil # 10.1  / Auto Neutrophil % 85.2  / BANDS % x                            13.2   9.6   )-----------( 178      ( 11 Mar 2018 23:49 )             39.1     Auto Eosinophil # 0.0   / Auto Eosinophil % 0.5   / Auto Neutrophil # 7.3   / Auto Neutrophil % 76.3  / BANDS % x        INR: 1.02 ratio (03-12 @ 06:55)  INR: 1.09 ratio (03-11 @ 23:49)    03-11    139  |  101  |  16  ----------------------------<  129<H>  3.8   |  23  |  0.95    Ca    9.1      11 Mar 2018 23:53  Mg     1.9     03-11  Phos  2.0     03-11  TPro  7.2  /  Alb  4.0  /  TBili  1.0  /  DBili  x   /  AST  515<H>  /  ALT  89<H>  /  AlkPhos  76  03-11        proBNP: Serum Pro-Brain Natriuretic Peptide: 113 pg/mL (03-11 @ 23:53)    Lipid Profile:   HgA1c:   TSH:     CARDIAC MARKERS:   11 Mar 2018 23:53 Troponin 15.67 ng/mL / Creatine Kinase 5545 U/L /  CKMB 162.5 ng/mL / CPK Mass Assay % 2.9 %    TELEMETRY: no overnight events of telemetry, sinus rhythm 	    ECG:  	  RADIOLOGY: cxr: no focal consolidations with some note of pulmonary vessel congestion on personal review   OTHER:

## 2018-03-12 NOTE — CHART NOTE - NSCHARTNOTEFT_GEN_A_CORE
====================  CCU MIDNIGHT ROUNDS  ====================    CURTIS RUSH  [id number]    ====================  SUMMARY: HPI:  Pt is a 43 yo M with PMH HLD(not on meds and has not seen a doctor in 5 years), family hx of MI, who presented to OSH c/o substernal 10/10 squeezing chest pain that began 1 hr prior to presentation. Pain radiated to left arm and was associated with diaphoresis and dyspnea. Pt endorses a few weeks of similar CP episodes precipitated by exertion at work and relieved with rest. Pt did not seek medical attention at that time. In ED at OSH EKG w/ SALLY inferior leads with STD I, AVL, V1, V2. Pt loaded with ASA and brilinta and transferred to [facility] for urgent PCI, cath lab activated. In cath lab pt loaded w/ heparin and found to have pCirc 100% DESx1, SHASHI 80% DESx1, EF 35% assisted with IABP placement in right groin due to slow flow in the SHASHI distally. During cath, pt went into hemodynamically unstable rhythm SVT with aberrancy vs VT despite lidocaine and amiodarone administration prompting sedation and cardioversion with return to sinus rhythm. LVEDP was 26. Pt admitted to CCU for continued management of STEMI. (11 Mar 2018 22:49)    ====================        ====================  NEW EVENTS:  ====================        ====================  VITALS (Last 12 hrs):  ====================    Vital Signs Last 24 Hrs  T(C): 37.3 (12 Mar 2018 19:00), Max: 37.3 (12 Mar 2018 19:00)  T(F): 99.2 (12 Mar 2018 19:00), Max: 99.2 (12 Mar 2018 19:00)  HR: 72 (12 Mar 2018 22:00) (64 - 86)  BP: 99/63 (11 Mar 2018 22:39) (99/63 - 99/63)  BP(mean): 73 (11 Mar 2018 22:39) (73 - 73)  RR: 21 (12 Mar 2018 22:00) (14 - 23)  SpO2: 98% (12 Mar 2018 22:00) (92% - 99%)    TELEMETRY:        *BLOOD GAS/ARTERIAL/MIXED/VENOUS  *LACTATE    I&O's Summary    11 Mar 2018 07:01  -  12 Mar 2018 07:00  --------------------------------------------------------  IN: 250 mL / OUT: 800 mL / NET: -550 mL    12 Mar 2018 07:01  -  12 Mar 2018 22:28  --------------------------------------------------------  IN: 706 mL / OUT: 1150 mL / NET: -444 mL        ====================  PLAN:  - Hold hep gtt at 6am   - remove IABP 3/13  - c/w ASA, Brilinta, lipitor and lopressor     Alex Moreira MD PGY2  [phone] / [id number]   ====================    HEALTH ISSUES - PROBLEM Dx:        HEALTH ISSUES - R/O PROBLEM Dx: ====================  CCU MIDNIGHT ROUNDS  ====================    CURTIS RUSH  [id number]    ====================  SUMMARY: HPI:  Pt is a 41 yo M with PMH HLD(not on meds and has not seen a doctor in 5 years), family hx of MI, who presented to OSH c/o substernal 10/10 squeezing chest pain that began 1 hr prior to presentation. Pain radiated to left arm and was associated with diaphoresis and dyspnea. Pt endorses a few weeks of similar CP episodes precipitated by exertion at work and relieved with rest. Pt did not seek medical attention at that time. In ED at OSH EKG w/ SALLY inferior leads with STD I, AVL, V1, V2. Pt loaded with ASA and brilinta and transferred to [facility] for urgent PCI, cath lab activated. In cath lab pt loaded w/ heparin and found to have pCirc 100% DESx1, SHASHI 80% DESx1, EF 35% assisted with IABP placement in right groin due to slow flow in the SHASHI distally. During cath, pt went into hemodynamically unstable rhythm SVT with aberrancy vs VT despite lidocaine and amiodarone administration prompting sedation and cardioversion with return to sinus rhythm. LVEDP was 26. Pt admitted to CCU for continued management of STEMI. (11 Mar 2018 22:49)    ====================        ====================  NEW EVENTS:  ====================        ====================  VITALS (Last 12 hrs):  ====================    Vital Signs Last 24 Hrs  T(C): 37.3 (12 Mar 2018 19:00), Max: 37.3 (12 Mar 2018 19:00)  T(F): 99.2 (12 Mar 2018 19:00), Max: 99.2 (12 Mar 2018 19:00)  HR: 72 (12 Mar 2018 22:00) (64 - 86)  BP: 99/63 (11 Mar 2018 22:39) (99/63 - 99/63)  BP(mean): 73 (11 Mar 2018 22:39) (73 - 73)  RR: 21 (12 Mar 2018 22:00) (14 - 23)  SpO2: 98% (12 Mar 2018 22:00) (92% - 99%)    TELEMETRY:        *BLOOD GAS/ARTERIAL/MIXED/VENOUS  *LACTATE    I&O's Summary    11 Mar 2018 07:01  -  12 Mar 2018 07:00  --------------------------------------------------------  IN: 250 mL / OUT: 800 mL / NET: -550 mL    12 Mar 2018 07:01  -  12 Mar 2018 22:28  --------------------------------------------------------  IN: 706 mL / OUT: 1150 mL / NET: -444 mL        ====================  PLAN:  ====================    HEALTH ISSUES - PROBLEM Dx:        HEALTH ISSUES - R/O PROBLEM Dx: ====================  CCU MIDNIGHT ROUNDS  ====================    CURTIS RUSH  [id number]    ====================  SUMMARY: HPI:  Pt is a 43 yo M with PMH HLD(not on meds and has not seen a doctor in 5 years), family hx of MI, who presented to OSH c/o substernal 10/10 squeezing chest pain that began 1 hr prior to presentation. Pain radiated to left arm and was associated with diaphoresis and dyspnea. Pt endorses a few weeks of similar CP episodes precipitated by exertion at work and relieved with rest. Pt did not seek medical attention at that time. In ED at OSH EKG w/ SALLY inferior leads with STD I, AVL, V1, V2. Pt loaded with ASA and brilinta and transferred to [facility] for urgent PCI, cath lab activated. In cath lab pt loaded w/ heparin and found to have pCirc 100% DESx1, SHASHI 80% DESx1, EF 35% assisted with IABP placement in right groin due to slow flow in the SHASHI distally. During cath, pt went into hemodynamically unstable rhythm SVT with aberrancy vs VT despite lidocaine and amiodarone administration prompting sedation and cardioversion with return to sinus rhythm. LVEDP was 26. Pt admitted to CCU for continued management of STEMI. (11 Mar 2018 22:49)    ====================        ====================  NEW EVENTS:  ====================        ====================  VITALS (Last 12 hrs):  ====================    Vital Signs Last 24 Hrs  T(C): 37.3 (12 Mar 2018 19:00), Max: 37.3 (12 Mar 2018 19:00)  T(F): 99.2 (12 Mar 2018 19:00), Max: 99.2 (12 Mar 2018 19:00)  HR: 72 (12 Mar 2018 22:00) (64 - 86)  BP: 99/63 (11 Mar 2018 22:39) (99/63 - 99/63)  BP(mean): 73 (11 Mar 2018 22:39) (73 - 73)  RR: 21 (12 Mar 2018 22:00) (14 - 23)  SpO2: 98% (12 Mar 2018 22:00) (92% - 99%)    TELEMETRY:        *BLOOD GAS/ARTERIAL/MIXED/VENOUS  *LACTATE    I&O's Summary    11 Mar 2018 07:01  -  12 Mar 2018 07:00  --------------------------------------------------------  IN: 250 mL / OUT: 800 mL / NET: -550 mL    12 Mar 2018 07:01  -  12 Mar 2018 22:28  --------------------------------------------------------  IN: 706 mL / OUT: 1150 mL / NET: -444 mL        ====================  PLAN:  #Neuro- A&Ox3  #CV- s/p ENOC to pCIRC (100% stenosis) and ENOC to OM1 with 80% stenosis  - staging of ramus/circ on tuesday   - IABP in place, plan to hold Hep gtt at 6am for IABP removal   #Pulm- stable  #Electrolytes- repleted potassium overnight   #Heme- stable    Alex Moreira MD PGY2  [phone] / [id number]   ====================    HEALTH ISSUES - PROBLEM Dx:        HEALTH ISSUES - R/O PROBLEM Dx: ====================  CCU MIDNIGHT ROUNDS  ====================    CURTIS RUSH  [id number]    ====================  SUMMARY: HPI:  Pt is a 41 yo M with PMH HLD(not on meds and has not seen a doctor in 5 years), family hx of MI, who presented to OSH c/o substernal 10/10 squeezing chest pain that began 1 hr prior to presentation. Pain radiated to left arm and was associated with diaphoresis and dyspnea. Pt endorses a few weeks of similar CP episodes precipitated by exertion at work and relieved with rest. Pt did not seek medical attention at that time. In ED at OSH EKG w/ SALLY inferior leads with STD I, AVL, V1, V2. Pt loaded with ASA and brilinta and transferred to [facility] for urgent PCI, cath lab activated. In cath lab pt loaded w/ heparin and found to have pCirc 100% DESx1, SHASHI 80% DESx1, EF 35% assisted with IABP placement in right groin due to slow flow in the SHASHI distally. During cath, pt went into hemodynamically unstable rhythm SVT with aberrancy vs VT despite lidocaine and amiodarone administration prompting sedation and cardioversion with return to sinus rhythm. LVEDP was 26. Pt admitted to CCU for continued management of STEMI. (11 Mar 2018 22:49)    ====================        ====================  NEW EVENTS:  stable, started lisinopril   ====================        ====================  VITALS (Last 12 hrs):  ====================    Vital Signs Last 24 Hrs  T(C): 37.3 (12 Mar 2018 19:00), Max: 37.3 (12 Mar 2018 19:00)  T(F): 99.2 (12 Mar 2018 19:00), Max: 99.2 (12 Mar 2018 19:00)  HR: 72 (12 Mar 2018 22:00) (64 - 86)  BP: 99/63 (11 Mar 2018 22:39) (99/63 - 99/63)  BP(mean): 73 (11 Mar 2018 22:39) (73 - 73)  RR: 21 (12 Mar 2018 22:00) (14 - 23)  SpO2: 98% (12 Mar 2018 22:00) (92% - 99%)    TELEMETRY:        *BLOOD GAS/ARTERIAL/MIXED/VENOUS  *LACTATE    I&O's Summary    11 Mar 2018 07:01  -  12 Mar 2018 07:00  --------------------------------------------------------  IN: 250 mL / OUT: 800 mL / NET: -550 mL    12 Mar 2018 07:01  -  12 Mar 2018 22:28  --------------------------------------------------------  IN: 706 mL / OUT: 1150 mL / NET: -444 mL        ====================  PLAN:  #Neuro- A&Ox3  #CV- s/p ENOC to pCIRC (100% stenosis) and ENOC to OM1 with 80% stenosis  - staging of ramus/circ on tuesday   - IABP in place, plan to hold Hep gtt at 6am for IABP removal   - started lisinopril 5mg   #Pulm- stable  #Electrolytes- repleted potassium overnight   #Heme- stable    Alex Moreira MD PGY2  [phone] / [phone]   ====================    HEALTH ISSUES - PROBLEM Dx:        HEALTH ISSUES - R/O PROBLEM Dx:

## 2018-03-12 NOTE — PROGRESS NOTE ADULT - ASSESSMENT
43 y/o M with PMH of HLD who presented to OSH with substernal chest pain found to have inferior wall SALLY s/p ENOC x1 to pCIRC abd ENOC x1 to OM1 with IABP in place    # Neuro  - AAO x3 mentating well    # Cardiac  - s/p ENOC to pCIRC (100% stenosis) and ENOC to OM1 with 80% stenosis  - c/w ASA and brillinta  - c/w heparin gtt while IABP in place - - 1:1 agumeting to low 100s with means in 80s  - c/w lipitor 80QHS  - holding ACEi and BB in the interm    # Resp  - satting well on RA  - CXR: no focal consolidations with mild pulm congestion noted on cxr    # GI  - no GI issues   - transaminitis stable but elevated possible 2/2 congestive hepatopathy     # Renal  - stable creatinine  - will continue to monitor     #ID  - Afebrile, no evidence of infection    # skin  - groin site intact 43 y/o M with PMH of HLD who presented to OSH with substernal chest pain found to have inferior wall SALLY s/p ENOC x1 to pCIRC abd ENOC x1 to OM1 with IABP in place    # Neuro  - AAO x3 mentating well    # Cardiac  - s/p ENOC to pCIRC (100% stenosis) and ENOC to OM1 with 80% stenosis  - continue to trend cardiac enzymes: CK trending down however CKMB still holdings   - c/w ASA and brillinta  - c/w heparin gtt while IABP in place - - 1:1 agumeting to low 100s with means in 80s, will need to retract slightly -- previous CXR showing at the level of aortic knob  - c/w lipitor 80QHS  - holding ACEi and BB in the interm  - will need TTE on discharge     # Resp  - satting well on RA  - CXR: no focal consolidations with mild pulm congestion noted on cxr    # GI  - no GI issues   - transaminitis stable but still elevated in the setting of STEMI, will continue to monitor     # Renal  - stable creatinine  - will continue to monitor     #ID  - Afebrile, no evidence of infection    # skin  - groin site intact

## 2018-03-13 LAB
ALBUMIN SERPL ELPH-MCNC: 3.7 G/DL — SIGNIFICANT CHANGE UP (ref 3.3–5)
ALP SERPL-CCNC: 71 U/L — SIGNIFICANT CHANGE UP (ref 40–120)
ALT FLD-CCNC: 75 U/L RC — HIGH (ref 10–45)
ANION GAP SERPL CALC-SCNC: 11 MMOL/L — SIGNIFICANT CHANGE UP (ref 5–17)
APTT BLD: 69 SEC — HIGH (ref 27.5–37.4)
AST SERPL-CCNC: 211 U/L — HIGH (ref 10–40)
BILIRUB SERPL-MCNC: 1.7 MG/DL — HIGH (ref 0.2–1.2)
BUN SERPL-MCNC: 13 MG/DL — SIGNIFICANT CHANGE UP (ref 7–23)
CALCIUM SERPL-MCNC: 9 MG/DL — SIGNIFICANT CHANGE UP (ref 8.4–10.5)
CHLORIDE SERPL-SCNC: 104 MMOL/L — SIGNIFICANT CHANGE UP (ref 96–108)
CK MB BLD-MCNC: 2 % — SIGNIFICANT CHANGE UP (ref 0–3.5)
CK MB CFR SERPL CALC: 37.7 NG/ML — HIGH (ref 0–6.7)
CK SERPL-CCNC: 1865 U/L — HIGH (ref 30–200)
CO2 SERPL-SCNC: 23 MMOL/L — SIGNIFICANT CHANGE UP (ref 22–31)
CREAT SERPL-MCNC: 0.8 MG/DL — SIGNIFICANT CHANGE UP (ref 0.5–1.3)
GLUCOSE SERPL-MCNC: 129 MG/DL — HIGH (ref 70–99)
HCT VFR BLD CALC: 37.5 % — LOW (ref 39–50)
HGB BLD-MCNC: 12.8 G/DL — LOW (ref 13–17)
MAGNESIUM SERPL-MCNC: 2.2 MG/DL — SIGNIFICANT CHANGE UP (ref 1.6–2.6)
MCHC RBC-ENTMCNC: 29.9 PG — SIGNIFICANT CHANGE UP (ref 27–34)
MCHC RBC-ENTMCNC: 34.3 GM/DL — SIGNIFICANT CHANGE UP (ref 32–36)
MCV RBC AUTO: 87.3 FL — SIGNIFICANT CHANGE UP (ref 80–100)
PHOSPHATE SERPL-MCNC: 2.3 MG/DL — LOW (ref 2.5–4.5)
PLATELET # BLD AUTO: 149 K/UL — LOW (ref 150–400)
POTASSIUM SERPL-MCNC: 3.7 MMOL/L — SIGNIFICANT CHANGE UP (ref 3.5–5.3)
POTASSIUM SERPL-SCNC: 3.7 MMOL/L — SIGNIFICANT CHANGE UP (ref 3.5–5.3)
PROT SERPL-MCNC: 7.2 G/DL — SIGNIFICANT CHANGE UP (ref 6–8.3)
RBC # BLD: 4.29 M/UL — SIGNIFICANT CHANGE UP (ref 4.2–5.8)
RBC # FLD: 12.9 % — SIGNIFICANT CHANGE UP (ref 10.3–14.5)
SODIUM SERPL-SCNC: 138 MMOL/L — SIGNIFICANT CHANGE UP (ref 135–145)
TROPONIN T SERPL-MCNC: 4.58 NG/ML — HIGH (ref 0–0.06)
WBC # BLD: 11.1 K/UL — HIGH (ref 3.8–10.5)
WBC # FLD AUTO: 11.1 K/UL — HIGH (ref 3.8–10.5)

## 2018-03-13 PROCEDURE — 99291 CRITICAL CARE FIRST HOUR: CPT

## 2018-03-13 PROCEDURE — 71045 X-RAY EXAM CHEST 1 VIEW: CPT | Mod: 26

## 2018-03-13 PROCEDURE — 93010 ELECTROCARDIOGRAM REPORT: CPT

## 2018-03-13 RX ORDER — FENTANYL CITRATE 50 UG/ML
25 INJECTION INTRAVENOUS ONCE
Qty: 0 | Refills: 0 | Status: DISCONTINUED | OUTPATIENT
Start: 2018-03-13 | End: 2018-03-13

## 2018-03-13 RX ORDER — LISINOPRIL 2.5 MG/1
5 TABLET ORAL DAILY
Qty: 0 | Refills: 0 | Status: DISCONTINUED | OUTPATIENT
Start: 2018-03-13 | End: 2018-03-14

## 2018-03-13 RX ORDER — METOPROLOL TARTRATE 50 MG
25 TABLET ORAL DAILY
Qty: 0 | Refills: 0 | Status: DISCONTINUED | OUTPATIENT
Start: 2018-03-13 | End: 2018-03-14

## 2018-03-13 RX ORDER — POTASSIUM CHLORIDE 20 MEQ
40 PACKET (EA) ORAL ONCE
Qty: 0 | Refills: 0 | Status: COMPLETED | OUTPATIENT
Start: 2018-03-13 | End: 2018-03-13

## 2018-03-13 RX ADMIN — Medication 40 MILLIEQUIVALENT(S): at 06:16

## 2018-03-13 RX ADMIN — LISINOPRIL 5 MILLIGRAM(S): 2.5 TABLET ORAL at 06:16

## 2018-03-13 RX ADMIN — ATORVASTATIN CALCIUM 80 MILLIGRAM(S): 80 TABLET, FILM COATED ORAL at 21:26

## 2018-03-13 RX ADMIN — Medication 1 TABLET(S): at 02:40

## 2018-03-13 RX ADMIN — Medication 12.5 MILLIGRAM(S): at 06:16

## 2018-03-13 RX ADMIN — Medication 25 MILLIGRAM(S): at 17:49

## 2018-03-13 RX ADMIN — TICAGRELOR 90 MILLIGRAM(S): 90 TABLET ORAL at 17:50

## 2018-03-13 RX ADMIN — TICAGRELOR 90 MILLIGRAM(S): 90 TABLET ORAL at 06:16

## 2018-03-13 RX ADMIN — Medication 81 MILLIGRAM(S): at 12:08

## 2018-03-13 RX ADMIN — FENTANYL CITRATE 25 MICROGRAM(S): 50 INJECTION INTRAVENOUS at 13:45

## 2018-03-13 RX ADMIN — FENTANYL CITRATE 25 MICROGRAM(S): 50 INJECTION INTRAVENOUS at 13:30

## 2018-03-13 NOTE — DIETITIAN INITIAL EVALUATION ADULT. - ORAL INTAKE PTA
good/Usual breakfast - field egg and cheese on bagel; Lunch - fast food; Dinner - wife frequently prepares pasta, some vegetables, and meats

## 2018-03-13 NOTE — PROGRESS NOTE ADULT - ATTENDING COMMENTS
Patient seen and examined. Agree with assessment and plan as outlined above.  43 yo M with inf STEMI s/p PCI to LCX and SHASHI complicated by WCT status-post cardioversion. Plan to discontinue IABP today. Titrate up ACE-inhibitor, beta-blocker. Continue CCU care.
42 year-old man with inferior ST-elevation MI s/p LCx and SHASHI with IABP placed for poor coronary flow with SVT with aberrancy vs. VT with successful cardioversion. Patient to start low dose metoprolol while on IABP. LVEDP=43 mmHg. Patient negative 750 mL overnight. EF 35% on left heart catheterization. Check TTE. Continue CCU care. Plan for IABP removal on 3/13 after IV diuresis and initiation of medications.

## 2018-03-13 NOTE — DIETITIAN INITIAL EVALUATION ADULT. - ENERGY NEEDS
Height: 71 inches, Weight: 223.5 pounds  BMI: 31.2 kg/m2 IBW: 172 pounds (+/-10%), %IBW: 130%  Pertinent Info: Per chart, 43 y/o male with HLD admitted with IWSTEMI s/p ENOC to pCIRC and OM1 with IABP, EF 35%. No edema, no pressure ulcers noted at this time.

## 2018-03-13 NOTE — CHART NOTE - NSCHARTNOTEFT_GEN_A_CORE
Removal of Intra-Aortic Balloon Pump    The IABP (Intra-Aortic Balloon Pump) was weaned according to protocol.  Hemodynamics remained stable.  Pulses in the right lower extremity are palpable and audible by doppler. The patient was placed in the supine position. The insertion site was identified and the sutures were removed per protocol.  The IABP was turned off, the balloon deflated and then removed.  Direct pressure was applied for  31 minutes.     Monitoring of the (right) groin and both lower extremities including neuro-vascular checks and vital signs every 15 minutes x 4, then every 30 minutes x 2, then every 1 hour was ordered.    Complications: small hematoma with resolution following compression at the bedside    Comments: Pt asymptomatic. Monitor CBC and site as per protocol.

## 2018-03-13 NOTE — DIETITIAN INITIAL EVALUATION ADULT. - PERTINENT LABORATORY DATA
Na 138 [135 - 145], K+ 3.7 [3.5 - 5.3], BUN 13 [7 - 23], Cr 0.80 [0.50 - 1.30],  [70 - 99], Phos 2.3 [2.5 - 4.5], Alk Phos 71 [40 - 120],  [10 - 40], ALT 75 [10 - 45], Mg 2.2 [1.6 - 2.6], Ca 9.0 [8.4 - 10.5]

## 2018-03-13 NOTE — PROGRESS NOTE ADULT - SUBJECTIVE AND OBJECTIVE BOX
Patient is a 42y old  Male who presents with a chief complaint of Chest pain (11 Mar 2018 22:49)    Subjective: Patient seen at bedside -- no acute issues or concerns. Was able to sleep several hours last night. No chest pain, shortness of breath or palpitations.     MEDICATIONS  (STANDING):  aspirin enteric coated 81 milliGRAM(s) Oral daily  atorvastatin 80 milliGRAM(s) Oral at bedtime  lisinopril 5 milliGRAM(s) Oral daily  melatonin 1 milliGRAM(s) Oral at bedtime  metoprolol     tartrate 12.5 milliGRAM(s) Oral two times a day  ticagrelor 90 milliGRAM(s) Oral two times a day    MEDICATIONS  (PRN):  acetaminophen   Tablet. 500 milliGRAM(s) Oral every 6 hours PRN Moderate Pain (4 - 6)      REVIEW OF SYSTEMS:  Otherwise, 10 point ROS done and otherwise negative.    PHYSICAL EXAM:  Vital Signs Last 24 Hrs  T(C): 36.7 (13 Mar 2018 05:00), Max: 37.3 (12 Mar 2018 19:00)  T(F): 98 (13 Mar 2018 05:00), Max: 99.2 (12 Mar 2018 19:00)  HR: 66 (13 Mar 2018 07:00) (66 - 84)  BP: 110/53 (13 Mar 2018 05:00) (110/53 - 110/53)  BP(mean): 72 (13 Mar 2018 05:00) (72 - 72)  RR: 13 (13 Mar 2018 07:00) (12 - 24)  SpO2: 96% (13 Mar 2018 07:00) (95% - 98%)  I&O's Summary    12 Mar 2018 07:01  -  13 Mar 2018 07:00  --------------------------------------------------------  IN: 960 mL / OUT: 2000 mL / NET: -1040 mL    Appearance: Normal	  HEENT:   Normal oral mucosa, PERRL, EOMI	  Lymphatic: No lymphadenopathy  Cardiovascular: Normal S1 S2, No JVD, No murmurs, no rubs or gallops  Respiratory: Lungs clear to auscultation, no wheezes, rales or rhonchi 	  Psychiatry: A & O x 3, Mood & affect appropriate  Gastrointestinal:  Soft, Non-tender, + BS, no organomegaly 	  Skin: No rashes, No ecchymoses, No cyanosis, site looks dry and intact 	  Neurologic: Non-focal  Extremities: Normal range of motion, No clubbing, cyanosis or edema  Vascular: Peripheral pulses palpable 2+ bilaterally    LABS:	 	                        12.8   11.1  )-----------( 149      ( 13 Mar 2018 05:14 )             37.5     Auto Eosinophil # x     / Auto Eosinophil % x     / Auto Neutrophil # x     / Auto Neutrophil % x     / BANDS % x                            13.0   12.6  )-----------( 150      ( 12 Mar 2018 22:10 )             37.8     Auto Eosinophil # 0.0   / Auto Eosinophil % 0.2   / Auto Neutrophil # 9.5   / Auto Neutrophil % 74.9  / BANDS % x                            13.2   13.8  )-----------( 168      ( 12 Mar 2018 14:25 )             38.2     Auto Eosinophil # 0.0   / Auto Eosinophil % 0.1   / Auto Neutrophil # 11.3  / Auto Neutrophil % 81.8  / BANDS % x        INR: 1.02 ratio (03-12 @ 06:55)  INR: 1.09 ratio (03-11 @ 23:49)    03-13    138  |  104  |  13  ----------------------------<  129<H>  3.7   |  23  |  0.80  03-12    138  |  103  |  14  ----------------------------<  117<H>  3.7   |  25  |  0.87  03-12    138  |  102  |  15  ----------------------------<  128<H>  3.7   |  25  |  0.83    Ca    9.0      13 Mar 2018 05:14  Mg     2.2     03-13  Phos  2.3     03-13  TPro  7.2  /  Alb  3.7  /  TBili  1.7<H>  /  DBili  x   /  AST  211<H>  /  ALT  75<H>  /  AlkPhos  71  03-13  TPro  7.6  /  Alb  4.1  /  TBili  1.5<H>  /  DBili  x   /  AST  288<H>  /  ALT  88<H>  /  AlkPhos  70  03-12  TPro  7.6  /  Alb  4.1  /  TBili  1.3<H>  /  DBili  x   /  AST  397<H>  /  ALT  98<H>  /  AlkPhos  73  03-12    proBNP: Serum Pro-Brain Natriuretic Peptide: 113 pg/mL (03-11 @ 23:53)    Lipid Profile: 03-12 Chol 198 <H> HDL 40 Trig 112  HgA1c: 5.6 % (03-12 @ 07:32)    TSH: Thyroid Stimulating Hormone, Serum: 1.06 uIU/mL (03-12 @ 07:32)    CARDIAC MARKERS:   13 Mar 2018 05:14 Troponin 4.58 ng/mL / Creatine Kinase 1865 U/L /  CKMB 37.7 ng/mL / CPK Mass Assay % 2.0 %   12 Mar 2018 14:25 Troponin 5.85 ng/mL / Creatine Kinase 3545 U/L /  CKMB 110.9 ng/mL / CPK Mass Assay % x       12 Mar 2018 06:55 Troponin 6.69 ng/mL / Creatine Kinase 4606 U/L /  CKMB 186.6 ng/mL / CPK Mass Assay % 4.1 %    TELEMETRY: 	    ECG:  	  RADIOLOGY:  No new imaging   OTHER: 	    PREVIOUS DIAGNOSTIC TESTING:    Echocardiogram:  < from: Transthoracic Echocardiogram (03.12.18 @ 12:19) >  Conclusions:  1. Moderate segmental left ventricular systolic  dysfunction. EF 40-45$% The inferolateral wall, the  basal-mid  inferior wall, the anterolateral wall, and the  basal inferoseptum are hypokinetic.  2. Normal right ventricular size and low nomal systolic  function.    < end of copied text >    Catheterization:  < from: Cardiac Cath Lab - Adult (03.11.18 @ 20:48) >  LM:   --  LM: The vessel was large sized. Angiography showed mild  atherosclerosis with no flow limiting lesions.  LAD:   --  Proximal LAD: Angiography showed mild atherosclerosis with no  flow limiting lesions.  --  Mid LAD: There was a 50 % stenosis.  --  Distal LAD: There was a 70 % stenosis.  --  D1: The vessel was medium sized. Angiography showed moderate  atherosclerosis.  --  D2: The vessel was medium sized. Angiography showed moderate  atherosclerosis.  CX:   --  Proximal circumflex: There was a 100 % stenosis.  --  Distal circumflex: There was a 80 % stenosis.  --  OM1: There was a 80 % stenosis.  RCA:   --  ProximalRCA: The vessel was medium sized. Angiography showed  mild atherosclerosis with no flow limiting lesions.  --  Mid RCA: There was a 70 % stenosis. Distal vessel angiography showed a  small vessel. Non-dominant vessel.  COMPLICATIONS: There were no complications.  DIAGNOSTIC RECOMMENDATIONS: Left dominant coronary system.  The proximal circumflex was 100% occluded and thrombotic (culprit vessel)  Moderate disease in the mid and distal LAD>  LVEF moderate to severely reduced.  Interventional Summary:  -Successful ENOC to the proximal circumflex.  -Successful ENOC to the OM1.  -Successful PTA to the LPDA.  -IABP placed due to slow flow/no reflow in the LPDA and OM1 distally.  -Synchonized cardioversion performed due to hemodynamically unstable  rhythm-- SVT with aberrancy vs VT (despite lidocaine and amiodarone  administration). Successful return to sinus rhythm with cardioversion.    < end of copied text >    [ ] Stress Test:

## 2018-03-13 NOTE — PROGRESS NOTE ADULT - ASSESSMENT
41 y/o M with PMH of HLD who presented to OSH with substernal chest pain found to have inferior wall SALLY s/p ENOC x1 to pCIRC abd ENOC x1 to OM1 with IABP in place    # Neuro  - AAO x3 mentating well    # Cardiac  - s/p ENOC to pCIRC (100% stenosis) and ENOC to OM1 with 80% stenosis  - cardiac enzymes trending down globally   - c/w ASA and brillinta  - heparin drip held this morning, IABP removal planned today, augmenting in the low 100s   - c/w lipitor 80QHS  - tolerating beta blocker well with lisinopril started this morning  - TTE: LVEF 40-45% with inferolateral wall, basal-mid inferior wall, anterolateral wall and basal inferoseptum hypokinesis    # Resp  - satting well on RA    # GI  - no GI issues   - transaminitis trending down, will continue to monitor      # Renal  - stable creatinine  - will continue to monitor     #ID  - Afebrile, no evidence of infection    # skin  - groin site intact    Erich Joyce  PGY1  [phone]

## 2018-03-13 NOTE — DIETITIAN INITIAL EVALUATION ADULT. - OTHER INFO
Nutrition consult received for STEMI. Pt reports fair to good appetite, reports 50-75% po intake of meals. No GI distress, +BM today. Pt denies chewing/swallowing difficulties. NKFA. PTA denies taking supplements.

## 2018-03-14 ENCOUNTER — TRANSCRIPTION ENCOUNTER (OUTPATIENT)
Age: 43
End: 2018-03-14

## 2018-03-14 VITALS — HEART RATE: 72 BPM | DIASTOLIC BLOOD PRESSURE: 56 MMHG | SYSTOLIC BLOOD PRESSURE: 98 MMHG

## 2018-03-14 PROBLEM — E78.5 HYPERLIPIDEMIA, UNSPECIFIED: Chronic | Status: ACTIVE | Noted: 2018-03-11

## 2018-03-14 PROBLEM — Z00.00 ENCOUNTER FOR PREVENTIVE HEALTH EXAMINATION: Status: ACTIVE | Noted: 2018-03-14

## 2018-03-14 LAB
ALBUMIN SERPL ELPH-MCNC: 3.9 G/DL — SIGNIFICANT CHANGE UP (ref 3.3–5)
ALP SERPL-CCNC: 75 U/L — SIGNIFICANT CHANGE UP (ref 40–120)
ALT FLD-CCNC: 63 U/L RC — HIGH (ref 10–45)
ANION GAP SERPL CALC-SCNC: 12 MMOL/L — SIGNIFICANT CHANGE UP (ref 5–17)
AST SERPL-CCNC: 109 U/L — HIGH (ref 10–40)
BASOPHILS # BLD AUTO: 0.1 K/UL — SIGNIFICANT CHANGE UP (ref 0–0.2)
BASOPHILS NFR BLD AUTO: 0.5 % — SIGNIFICANT CHANGE UP (ref 0–2)
BILIRUB SERPL-MCNC: 1.3 MG/DL — HIGH (ref 0.2–1.2)
BUN SERPL-MCNC: 15 MG/DL — SIGNIFICANT CHANGE UP (ref 7–23)
CALCIUM SERPL-MCNC: 9.2 MG/DL — SIGNIFICANT CHANGE UP (ref 8.4–10.5)
CHLORIDE SERPL-SCNC: 103 MMOL/L — SIGNIFICANT CHANGE UP (ref 96–108)
CO2 SERPL-SCNC: 25 MMOL/L — SIGNIFICANT CHANGE UP (ref 22–31)
CREAT SERPL-MCNC: 0.89 MG/DL — SIGNIFICANT CHANGE UP (ref 0.5–1.3)
EOSINOPHIL # BLD AUTO: 0.1 K/UL — SIGNIFICANT CHANGE UP (ref 0–0.5)
EOSINOPHIL NFR BLD AUTO: 0.8 % — SIGNIFICANT CHANGE UP (ref 0–6)
GLUCOSE SERPL-MCNC: 121 MG/DL — HIGH (ref 70–99)
HCT VFR BLD CALC: 39 % — SIGNIFICANT CHANGE UP (ref 39–50)
HGB BLD-MCNC: 13.5 G/DL — SIGNIFICANT CHANGE UP (ref 13–17)
LYMPHOCYTES # BLD AUTO: 18.9 % — SIGNIFICANT CHANGE UP (ref 13–44)
LYMPHOCYTES # BLD AUTO: 2 K/UL — SIGNIFICANT CHANGE UP (ref 1–3.3)
MAGNESIUM SERPL-MCNC: 2.2 MG/DL — SIGNIFICANT CHANGE UP (ref 1.6–2.6)
MCHC RBC-ENTMCNC: 30.2 PG — SIGNIFICANT CHANGE UP (ref 27–34)
MCHC RBC-ENTMCNC: 34.7 GM/DL — SIGNIFICANT CHANGE UP (ref 32–36)
MCV RBC AUTO: 87.2 FL — SIGNIFICANT CHANGE UP (ref 80–100)
MONOCYTES # BLD AUTO: 0.8 K/UL — SIGNIFICANT CHANGE UP (ref 0–0.9)
MONOCYTES NFR BLD AUTO: 7 % — SIGNIFICANT CHANGE UP (ref 2–14)
NEUTROPHILS # BLD AUTO: 7.8 K/UL — HIGH (ref 1.8–7.4)
NEUTROPHILS NFR BLD AUTO: 72.7 % — SIGNIFICANT CHANGE UP (ref 43–77)
PHOSPHATE SERPL-MCNC: 2.9 MG/DL — SIGNIFICANT CHANGE UP (ref 2.5–4.5)
PLATELET # BLD AUTO: 152 K/UL — SIGNIFICANT CHANGE UP (ref 150–400)
POTASSIUM SERPL-MCNC: 4 MMOL/L — SIGNIFICANT CHANGE UP (ref 3.5–5.3)
POTASSIUM SERPL-SCNC: 4 MMOL/L — SIGNIFICANT CHANGE UP (ref 3.5–5.3)
PROT SERPL-MCNC: 7.9 G/DL — SIGNIFICANT CHANGE UP (ref 6–8.3)
RBC # BLD: 4.47 M/UL — SIGNIFICANT CHANGE UP (ref 4.2–5.8)
RBC # FLD: 12.7 % — SIGNIFICANT CHANGE UP (ref 10.3–14.5)
SODIUM SERPL-SCNC: 140 MMOL/L — SIGNIFICANT CHANGE UP (ref 135–145)
WBC # BLD: 10.8 K/UL — HIGH (ref 3.8–10.5)
WBC # FLD AUTO: 10.8 K/UL — HIGH (ref 3.8–10.5)

## 2018-03-14 PROCEDURE — C1887: CPT

## 2018-03-14 PROCEDURE — 93010 ELECTROCARDIOGRAM REPORT: CPT

## 2018-03-14 PROCEDURE — 33967 INSERT I-AORT PERCUT DEVICE: CPT

## 2018-03-14 PROCEDURE — 80053 COMPREHEN METABOLIC PANEL: CPT

## 2018-03-14 PROCEDURE — C1889: CPT

## 2018-03-14 PROCEDURE — 85610 PROTHROMBIN TIME: CPT

## 2018-03-14 PROCEDURE — 86900 BLOOD TYPING SEROLOGIC ABO: CPT

## 2018-03-14 PROCEDURE — 82553 CREATINE MB FRACTION: CPT

## 2018-03-14 PROCEDURE — 93005 ELECTROCARDIOGRAM TRACING: CPT

## 2018-03-14 PROCEDURE — C1769: CPT

## 2018-03-14 PROCEDURE — C1725: CPT

## 2018-03-14 PROCEDURE — 99233 SBSQ HOSP IP/OBS HIGH 50: CPT | Mod: GC

## 2018-03-14 PROCEDURE — 93306 TTE W/DOPPLER COMPLETE: CPT

## 2018-03-14 PROCEDURE — C1874: CPT

## 2018-03-14 PROCEDURE — 99152 MOD SED SAME PHYS/QHP 5/>YRS: CPT

## 2018-03-14 PROCEDURE — 93458 L HRT ARTERY/VENTRICLE ANGIO: CPT | Mod: 59

## 2018-03-14 PROCEDURE — 82550 ASSAY OF CK (CPK): CPT

## 2018-03-14 PROCEDURE — 83036 HEMOGLOBIN GLYCOSYLATED A1C: CPT

## 2018-03-14 PROCEDURE — C9606: CPT | Mod: LC

## 2018-03-14 PROCEDURE — 85027 COMPLETE CBC AUTOMATED: CPT

## 2018-03-14 PROCEDURE — 92960 CARDIOVERSION ELECTRIC EXT: CPT

## 2018-03-14 PROCEDURE — 84100 ASSAY OF PHOSPHORUS: CPT

## 2018-03-14 PROCEDURE — 84484 ASSAY OF TROPONIN QUANT: CPT

## 2018-03-14 PROCEDURE — 86850 RBC ANTIBODY SCREEN: CPT

## 2018-03-14 PROCEDURE — 84443 ASSAY THYROID STIM HORMONE: CPT

## 2018-03-14 PROCEDURE — 83880 ASSAY OF NATRIURETIC PEPTIDE: CPT

## 2018-03-14 PROCEDURE — C9601: CPT | Mod: LC

## 2018-03-14 PROCEDURE — 86901 BLOOD TYPING SEROLOGIC RH(D): CPT

## 2018-03-14 PROCEDURE — 76937 US GUIDE VASCULAR ACCESS: CPT

## 2018-03-14 PROCEDURE — 85730 THROMBOPLASTIN TIME PARTIAL: CPT

## 2018-03-14 PROCEDURE — C1894: CPT

## 2018-03-14 PROCEDURE — 83735 ASSAY OF MAGNESIUM: CPT

## 2018-03-14 PROCEDURE — 71045 X-RAY EXAM CHEST 1 VIEW: CPT

## 2018-03-14 PROCEDURE — 80061 LIPID PANEL: CPT

## 2018-03-14 PROCEDURE — 99153 MOD SED SAME PHYS/QHP EA: CPT

## 2018-03-14 RX ORDER — LISINOPRIL 2.5 MG/1
1 TABLET ORAL
Qty: 30 | Refills: 0
Start: 2018-03-14 | End: 2018-04-12

## 2018-03-14 RX ORDER — METOPROLOL TARTRATE 50 MG
0.5 TABLET ORAL
Qty: 0 | Refills: 0 | DISCHARGE
Start: 2018-03-14 | End: 2018-04-12

## 2018-03-14 RX ORDER — METOPROLOL TARTRATE 50 MG
1 TABLET ORAL
Qty: 30 | Refills: 0
Start: 2018-03-14 | End: 2018-04-12

## 2018-03-14 RX ORDER — ASPIRIN/CALCIUM CARB/MAGNESIUM 324 MG
1 TABLET ORAL
Qty: 30 | Refills: 0
Start: 2018-03-14 | End: 2018-04-12

## 2018-03-14 RX ORDER — ATORVASTATIN CALCIUM 80 MG/1
1 TABLET, FILM COATED ORAL
Qty: 30 | Refills: 0
Start: 2018-03-14 | End: 2018-04-12

## 2018-03-14 RX ORDER — TICAGRELOR 90 MG/1
1 TABLET ORAL
Qty: 60 | Refills: 0
Start: 2018-03-14 | End: 2018-04-12

## 2018-03-14 RX ORDER — LISINOPRIL 2.5 MG/1
0.5 TABLET ORAL
Qty: 0 | Refills: 0 | DISCHARGE
Start: 2018-03-14 | End: 2018-04-12

## 2018-03-14 RX ADMIN — Medication 25 MILLIGRAM(S): at 05:23

## 2018-03-14 RX ADMIN — Medication 81 MILLIGRAM(S): at 11:36

## 2018-03-14 RX ADMIN — LISINOPRIL 5 MILLIGRAM(S): 2.5 TABLET ORAL at 05:23

## 2018-03-14 RX ADMIN — TICAGRELOR 90 MILLIGRAM(S): 90 TABLET ORAL at 05:23

## 2018-03-14 NOTE — DISCHARGE NOTE ADULT - PATIENT PORTAL LINK FT
You can access the [facility] Patient Portal, offered by [facility], by registering with the following website: [email or web address]

## 2018-03-14 NOTE — PROGRESS NOTE ADULT - SUBJECTIVE AND OBJECTIVE BOX
Patient is a 42y old  Male who presents with a chief complaint of Chest pain (11 Mar 2018 22:49)    Event	  HPI:  Pt is a 43 yo M with PMH HLD(not on meds and has not seen a doctor in 5 years), family hx of MI, who presented to OSH c/o substernal 10/10 squeezing chest pain that began 1 hr prior to presentation. Pain radiated to left arm and was associated with diaphoresis and dyspnea. Pt endorses a few weeks of similar CP episodes precipitated by exertion at work and relieved with rest. Pt did not seek medical attention at that time. In ED at OSH EKG w/ SALLY inferior leads with STD I, AVL, V1, V2. Pt loaded with ASA and brilinta and transferred to [facility] for urgent PCI, cath lab activated. In cath lab pt loaded w/ heparin and found to have pCirc 100% DESx1, SHASHI 80% DESx1, EF 35% assisted with IABP placement in right groin due to slow flow in the SHASHI distally. During cath, pt went into hemodynamically unstable rhythm SVT with aberrancy vs VT despite lidocaine and amiodarone administration prompting sedation and cardioversion with return to sinus rhythm. LVEDP was 26. Pt admitted to CCU for continued management of STEMI. (11 Mar 2018 22:49)    Overnight: Patient reports feeling fine after the IABP was taken out. Does not feel any pain in his groin aside from when pressing on it. Was able to ambulate yesterday to the bathroom with getting short of breath and developing chest pain.     MEDICATIONS  (STANDING):  aspirin enteric coated 81 milliGRAM(s) Oral daily  atorvastatin 80 milliGRAM(s) Oral at bedtime  lisinopril 5 milliGRAM(s) Oral daily  melatonin 1 milliGRAM(s) Oral at bedtime  metoprolol succinate ER 25 milliGRAM(s) Oral daily  ticagrelor 90 milliGRAM(s) Oral two times a day    MEDICATIONS  (PRN):  acetaminophen   Tablet. 500 milliGRAM(s) Oral every 6 hours PRN Moderate Pain (4 - 6)      REVIEW OF SYSTEMS:  Otherwise, 10 point ROS done and otherwise negative.    PHYSICAL EXAM:  Vital Signs Last 24 Hrs  T(C): 36.8 (14 Mar 2018 07:30), Max: 36.8 (13 Mar 2018 13:25)  T(F): 98.2 (14 Mar 2018 07:30), Max: 98.2 (13 Mar 2018 13:25)  HR: 64 (14 Mar 2018 04:53) (64 - 88)  BP: 96/61 (14 Mar 2018 04:53) (96/61 - 120/68)  BP(mean): 70 (14 Mar 2018 04:53) (69 - 100)  RR: 8 (14 Mar 2018 04:53) (8 - 25)  SpO2: 98% (14 Mar 2018 04:53) (94% - 100%)  I&O's Summary    13 Mar 2018 07:01  -  14 Mar 2018 07:00  --------------------------------------------------------  IN: 640 mL / OUT: 1050 mL / NET: -410 mL    Appearance: Normal	  HEENT:   Normal oral mucosa, PERRL, EOMI	  Lymphatic: No lymphadenopathy  Cardiovascular: Normal S1 S2, No JVD, No murmurs, rubs or gallops  Respiratory: Lungs clear to auscultation, no wheezes, rales or rhonchi 	  Psychiatry: A & O x 3, Mood & affect appropriate  Gastrointestinal:  Soft, Non-tender, + BS	  Skin: Groin site appears dry and intact. No evidence of fluctuance or firmness on palpation 	  Neurologic: Non-focal  Extremities: Normal range of motion, No clubbing, cyanosis or edema  Vascular: Peripheral pulses palpable 2+ bilaterally    LABS:	 	                        13.5   10.8  )-----------( 152      ( 14 Mar 2018 04:11 )             39.0     Auto Eosinophil # 0.1   / Auto Eosinophil % 0.8   / Auto Neutrophil # 7.8   / Auto Neutrophil % 72.7  / BANDS % x                            12.8   11.1  )-----------( 149      ( 13 Mar 2018 05:14 )             37.5     Auto Eosinophil # x     / Auto Eosinophil % x     / Auto Neutrophil # x     / Auto Neutrophil % x     / BANDS % x                            13.0   12.6  )-----------( 150      ( 12 Mar 2018 22:10 )             37.8     Auto Eosinophil # 0.0   / Auto Eosinophil % 0.2   / Auto Neutrophil # 9.5   / Auto Neutrophil % 74.9  / BANDS % x        INR: 1.02 ratio (03-12 @ 06:55)  INR: 1.09 ratio (03-11 @ 23:49)    03-14    140  |  103  |  15  ----------------------------<  121<H>  4.0   |  25  |  0.89  03-13    138  |  104  |  13  ----------------------------<  129<H>  3.7   |  23  |  0.80  03-12    138  |  103  |  14  ----------------------------<  117<H>  3.7   |  25  |  0.87    Ca    9.2      14 Mar 2018 04:11  Mg     2.2     03-14  Phos  2.9     03-14  TPro  7.9  /  Alb  3.9  /  TBili  1.3<H>  /  DBili  x   /  AST  109<H>  /  ALT  63<H>  /  AlkPhos  75  03-14  TPro  7.2  /  Alb  3.7  /  TBili  1.7<H>  /  DBili  x   /  AST  211<H>  /  ALT  75<H>  /  AlkPhos  71  03-13  TPro  7.6  /  Alb  4.1  /  TBili  1.5<H>  /  DBili  x   /  AST  288<H>  /  ALT  88<H>  /  AlkPhos  70  03-12    proBNP: Serum Pro-Brain Natriuretic Peptide: 113 pg/mL (03-11 @ 23:53)    Lipid Profile: 03-12 Chol 198 <H> HDL 40 Trig 112  HgA1c: 5.6 % (03-12 @ 07:32)    TSH: Thyroid Stimulating Hormone, Serum: 1.06 uIU/mL (03-12 @ 07:32)      CARDIAC MARKERS:   13 Mar 2018 05:14 Troponin 4.58 ng/mL / Creatine Kinase 1865 U/L /  CKMB 37.7 ng/mL / CPK Mass Assay % 2.0 %   12 Mar 2018 14:25 Troponin 5.85 ng/mL / Creatine Kinase 3545 U/L /  CKMB 110.9 ng/mL / CPK Mass Assay % x       12 Mar 2018 06:55 Troponin 6.69 ng/mL / Creatine Kinase 4606 U/L /  CKMB 186.6 ng/mL / CPK Mass Assay % 4.1 %    TELEMETRY: 	    ECG:  	  RADIOLOGY:  OTHER: 	    PREVIOUS DIAGNOSTIC TESTING:    [ ] Echocardiogram:  < from: Transthoracic Echocardiogram (03.12.18 @ 12:19) >  Conclusions:  1. Moderate segmental left ventricular systolic  dysfunction. EF 40-45$% The inferolateral wall, the  basal-mid  inferior wall, the anterolateral wall, and the  basal inferoseptum are hypokinetic.  2. Normal right ventricular size and low nomal systolic  function.  *** No previous Echo exam.    < end of copied text >    [ ]  Catheterization:  < from: Cardiac Cath Lab - Adult (03.11.18 @ 20:48) >  LM:   --  LM: The vessel was large sized. Angiography showed mild  atherosclerosis with no flow limiting lesions.  LAD:   --  Proximal LAD: Angiography showed mild atherosclerosis with no  flow limiting lesions.  --  Mid LAD: There was a 50 % stenosis.  --  Distal LAD: There was a 70 % stenosis.  --  D1: The vessel was medium sized. Angiography showed moderate  atherosclerosis.  --  D2: The vessel was medium sized. Angiography showed moderate  atherosclerosis.  CX:   --  Proximal circumflex: There was a 100 % stenosis.  --  Distal circumflex: There was a 80 % stenosis.  --  OM1: There was a 80 % stenosis.  RCA:   --  ProximalRCA: The vessel was medium sized. Angiography showed  mild atherosclerosis with no flow limiting lesions.  --  Mid RCA: There was a 70 % stenosis. Distal vessel angiography showed a  small vessel. Non-dominant vessel.  COMPLICATIONS: There were no complications.  DIAGNOSTIC RECOMMENDATIONS: Left dominant coronary system.  The proximal circumflex was 100% occluded and thrombotic (culprit vessel)  Moderate disease in the mid and distal LAD>  LVEF moderate to severely reduced.  Interventional Summary:  -Successful ENOC to the proximal circumflex.  -Successful ENOC to the OM1.  -Successful PTA to the LPDA.    < end of copied text >    [ ] Stress Test:

## 2018-03-14 NOTE — DISCHARGE NOTE ADULT - HOSPITAL COURSE
42 year old male with PMH of HLD and family history of MI who presented from an OSH with chest pain. EKG showing SALLY in inferior leads with reciprocal depression in I, aVL, V1 and V2. Patient given asa and brillinta and sent over to [facility] for cath. Cath 3/11: revealing of 100% stenosis in pCIRC s/p ENOC 42 year old male with PMH of HLD and family history of MI who presented from an OSH with chest pain. EKG showing SALLY in inferior leads with reciprocal depression in I, aVL, V1 and V2. Patient given asa and brillinta and sent over to [facility] for cath. Cath 3/11: revealing of 100% stenosis in pCIRC s/p ENOC x1, 80% stenosis in OM1 s/p ENOC x 1 and 50% stenosis in mLAD, 70% stenosis in dLAD and 70% stenosis in mid RCA. IABP placed shortly after procedure for concern of irregular rhyhthm (SVT with aberrancy vs VT). Patient tolerated procedure well, with no residual chest pain with cardiac enzymes and transaminases trending downward. Started on beta blocker and ACEi successfully. Small hematoma noted on IABP removal with subsequent exams revealing no progression, no firmness or bruising. TTE performed revealing: moderate segmental LV dysfunction with LVEF 40-45%, inferolateral wall, basal-mid inferior, anterolateral and basal inferoseptum hypokinesis. (left dominant circulation)    Patient currently medically stable for discharge. Instructed to maintain a low salt and low cholesterol diet. Advised to not perform any strenuous activity for the next month at the least until he is seen by cardiology. Instructed to return urgently to the hospital with any pain, swelling, firmness of bruising at IABP insertion site or to return if any new onset chest pain, shortness of breath or palpitations arises. Patient instructed to continue with medications as instructed. Follow up appointment with Dr. Yeager on Tuesday 3/27 at 4:30 pm. Patient will follow up with Dr. Jaime Pelayo within 2 weeks of discharge 42 year old male with PMH of HLD and family history of MI who presented from an OSH with chest pain. EKG showing SALLY in inferior leads with reciprocal depression in I, aVL, V1 and V2. Patient given asa and brillinta and sent over to [facility] for cath. Cath 3/11: revealing of 100% stenosis in pCIRC s/p ENOC x1, 80% stenosis in OM1 s/p ENOC x 1 and 50% stenosis in mLAD, 70% stenosis in dLAD and 70% stenosis in mid RCA. IABP placed shortly after procedure for concern of irregular rhyhthm (SVT with aberrancy vs VT), refractory to amiodarone and lidocaine boluses with eventual cardioversion to NSR. Patient tolerated procedure well, with no residual chest pain with cardiac enzymes and transaminases trending downward. Started on beta blocker and ACEi successfully. Small hematoma noted on IABP removal with subsequent exams revealing no progression, no firmness or bruising. TTE performed revealing: moderate segmental LV dysfunction with LVEF 40-45%, inferolateral wall, basal-mid inferior, anterolateral and basal inferoseptum hypokinesis. (left dominant circulation)    Patient currently medically stable for discharge. Instructed to maintain a low salt and low cholesterol diet. Advised to not perform any strenuous activity for the next month at the least until he is seen by cardiology. Instructed to return urgently to the hospital with any pain, swelling, firmness of bruising at IABP insertion site or to return if any new onset chest pain, shortness of breath or palpitations arises. Patient instructed to continue with medications as instructed. Follow up appointment with Dr. Yeager on Tuesday 3/27 at 4:30 pm. Patient will follow up with Dr. Jaime Pelayo within 2 weeks of discharge

## 2018-03-14 NOTE — CHART NOTE - NSCHARTNOTEFT_GEN_A_CORE
====================  CCU MIDNIGHT ROUNDS  ====================    CURTIS RUSH  [id number]    ====================  SUMMARY: HPI:  Pt is a 41 yo M with PMH HLD(not on meds and has not seen a doctor in 5 years), family hx of MI, who presented to OSH c/o substernal 10/10 squeezing chest pain that began 1 hr prior to presentation. Pain radiated to left arm and was associated with diaphoresis and dyspnea. Pt endorses a few weeks of similar CP episodes precipitated by exertion at work and relieved with rest. Pt did not seek medical attention at that time. In ED at OSH EKG w/ SALLY inferior leads with STD I, AVL, V1, V2. Pt loaded with ASA and brilinta and transferred to [facility] for urgent PCI, cath lab activated. In cath lab pt loaded w/ heparin and found to have pCirc 100% DESx1, SHASHI 80% DESx1, EF 35% assisted with IABP placement in right groin due to slow flow in the SHASHI distally. During cath, pt went into hemodynamically unstable rhythm SVT with aberrancy vs VT despite lidocaine and amiodarone administration prompting sedation and cardioversion with return to sinus rhythm. LVEDP was 26. Pt admitted to CCU for continued management of STEMI. (11 Mar 2018 22:49)    ====================        ====================  NEW EVENTS: IABP removed today small hematoma compressed. Pt OOB and ambulating.   ====================        ====================  VITALS (Last 12 hrs):  ====================    T(C): 36.7 (03-13-18 @ 22:00), Max: 36.8 (03-13-18 @ 13:25)  HR: 69 (03-14-18 @ 00:00) (69 - 88)  BP: 97/59 (03-14-18 @ 00:00) (97/59 - 120/68)  BP(mean): 70 (03-13-18 @ 22:00) (69 - 100)  RR: 13 (03-14-18 @ 00:00) (12 - 25)  SpO2: 100% (03-14-18 @ 00:00) (94% - 100%)        TELEMETRY: sinus          I&O's Summary    12 Mar 2018 07:01  -  13 Mar 2018 07:00  --------------------------------------------------------  IN: 960 mL / OUT: 2000 mL / NET: -1040 mL    13 Mar 2018 07:01  -  14 Mar 2018 01:00  --------------------------------------------------------  IN: 640 mL / OUT: 1050 mL / NET: -410 mL        ====================  PLAN:  #IWSTEMI: s/p PCI to circ assisted with IABP which was removed during the day. Site is stable. C/w DAPT, lipitor, BB, and ACE. TTE shows EF 40%. Keep K>4 and Mg>2, OOB as tolerated  ====================    HEALTH ISSUES - PROBLEM Dx:        Darcy Russell, CCU PA #[id number]

## 2018-03-14 NOTE — DISCHARGE NOTE ADULT - PLAN OF CARE
Management You were found to have a blockage in one of your coronary arteries and one of the smaller vessels, resulting in a heart attack. During this admission, you had stents placed. You were started on several medications, some to help your heart remodeling and several to keep the stents open. It is very important that you take these medications. Please follow up with Dr. Yeager on Tuesday, 03/27 at 4:30 pm. Their contact information will be provided below Please continue taking lipitor as instructed

## 2018-03-14 NOTE — DISCHARGE NOTE ADULT - CARE PROVIDER_API CALL
Rianna Yeager), Cardiovascular Disease; Interventional Cardiology  [address] [place] [address]  Phone: [phone]  Fax: [phone] Rianna Yeager), Cardiovascular Disease; Interventional Cardiology  [address] [place] [address]  Phone: [phone]  Fax: [phone]    Meet Pelayo), Cardiovascular Disease; Endovascular Medicine; Internal Medicine; Interventional Cardiology; Vascular Medicine  [address] [place] [address]  Phone: [phone]  Fax: [phone]

## 2018-03-14 NOTE — DISCHARGE NOTE ADULT - MEDICATION SUMMARY - MEDICATIONS TO TAKE
I will START or STAY ON the medications listed below when I get home from the hospital:    aspirin 81 mg oral delayed release tablet  -- 1 tab(s) by mouth once a day  -- Indication: For stent placement    lisinopril 5 mg oral tablet  -- 1 tab(s) by mouth once a day  -- Indication: For MI    atorvastatin 80 mg oral tablet  -- 1 tab(s) by mouth once a day (at bedtime)  -- Indication: For MI    ticagrelor 90 mg oral tablet  -- 1 tab(s) by mouth 2 times a day  -- Indication: For Stent placement    metoprolol succinate 25 mg oral tablet, extended release  -- 1 tab(s) by mouth once a day  -- Indication: For MI

## 2018-03-14 NOTE — DISCHARGE NOTE ADULT - ADDITIONAL INSTRUCTIONS
1) Please follow up with Dr. Rianna Yeager from cardiology on Tuesday, the 27th at 4:30 pm at [address] at the hospital. Their contact information will be provided below   2) Please maintain a low salt and cholesterol diet  3) Please abstain from any rigorous physical activity (including at work) for at least 1 month until you are cleared by your cardiologist  4) If you notice any worsening swelling, firmness, bruising or pain at the site where you had the catheter removed, please return to the hospital urgently as this can be a sign of major bleeding. 1) Please follow up with Dr. Rianna Yeager from cardiology on Tuesday, the 27th at 4:30 pm at [address] at the hospital. Their contact information will be provided below. Please schedule an appointment with Dr. Meet Pelayo from Interventional cardiology. Their contact information will be provided below.   2) Please maintain a low salt and cholesterol diet  3) Please abstain from any rigorous physical activity (including at work) for at least 1 month until you are cleared by your cardiologist  4) If you notice any worsening swelling, firmness, bruising or pain at the site where you had the catheter removed, please return to the hospital urgently as this can be a sign of major bleeding.

## 2018-03-14 NOTE — DISCHARGE NOTE ADULT - CARE PLAN
Principal Discharge DX:	STEMI (ST elevation myocardial infarction)  Goal:	Management  Assessment and plan of treatment:	You were found to have a blockage in one of your coronary arteries and one of the smaller vessels, resulting in a heart attack. During this admission, you had stents placed. You were started on several medications, some to help your heart remodeling and several to keep the stents open. It is very important that you take these medications. Please follow up with Dr. Yeager on Tuesday, 03/27 at 4:30 pm. Their contact information will be provided below  Secondary Diagnosis:	Hyperlipidemia  Goal:	Management  Assessment and plan of treatment:	Please continue taking lipitor as instructed

## 2018-03-14 NOTE — DISCHARGE NOTE ADULT - CARE PROVIDERS DIRECT ADDRESSES
[email or web address]

## 2018-03-14 NOTE — DISCHARGE NOTE ADULT - OTHER SIGNIFICANT FINDINGS
< from: Cardiac Cath Lab - Adult (03.11.18 @ 20:48) >  VENTRICLES: Analysis of regional contractile function demonstrated mild  anterolateral hypokinesis, mild apical hypokinesis, severe diaphragmatic  hypokinesis, andsevere posterobasal hypokinesis. EF estimated was 40 %.  CORONARY VESSELS: The coronary circulation is left dominant.  LM:   --  LM: The vessel was large sized. Angiography showed mild  atherosclerosis with no flow limiting lesions.  LAD:   --  Proximal LAD: Angiography showed mild atherosclerosis with no  flow limiting lesions.  --  Mid LAD: There was a 50 % stenosis.  --  Distal LAD: There was a 70 % stenosis.  --  D1: The vessel was medium sized. Angiography showed moderate  atherosclerosis.  --  D2: The vessel was medium sized. Angiography showed moderate  atherosclerosis.  CX:   --  Proximal circumflex: There was a 100 % stenosis.  --  Distal circumflex: There was a 80 % stenosis.  --  OM1: There was a 80 % stenosis.  RCA:   --  ProximalRCA: The vessel was medium sized. Angiography showed  mild atherosclerosis with no flow limiting lesions.  --  Mid RCA: There was a 70 % stenosis. Distal vessel angiography showed a  small vessel. Non-dominant vessel.  COMPLICATIONS: There were no complications.  DIAGNOSTIC RECOMMENDATIONS: Left dominant coronary system.  The proximal circumflex was 100% occluded and thrombotic (culprit vessel)  Moderate disease in the mid and distal LAD>  LVEF moderate to severely reduced.  Interventional Summary:  -Successful ENOC to the proximal circumflex.  -Successful ENOC to the OM1.  -Successful PTA to the LPDA.  -IABP placed due to slow flow/no reflow in the LPDA and OM1 distally.  -Synchonized cardioversion performed due to hemodynamically unstable  rhythm-- SVT with aberrancy vs VT (despite lidocaine and amiodarone  administration). Successful return to sinus rhythm with cardioversion.  -Admit to CCU.  -heparin gtt as per protocol.  -dual antiplatelet therapy.  -FU with Dr. Meet Pelayo in office 2 weeks post-discharge.  INTERVENTIONAL RECOMMENDATIONS: Left dominant coronary system.  The proximal circumflex was 100% occluded and thrombotic (culprit vessel)  Moderate disease in the mid and distal LAD>  LVEF moderate to severely reduced.  Interventional Summary:  -Successful ENOC to the proximal circumflex.  -Successful ENOC to the OM1.  -Successful PTA to the LPDA.  -IABP placed due to slow flow/no reflow in the LPDA and OM1 distally.  -Synchonized cardioversion performed due to hemodynamically unstable  rhythm-- SVT with aberrancy vs VT (despite lidocaine and amiodarone  administration). Successful return to sinus rhythm with cardioversion.  -Admit to CCU.  -heparin gtt as per protocol.  -dual antiplatelet therapy.    < end of copied text >    < from: Transthoracic Echocardiogram (03.12.18 @ 12:19) >  Conclusions:  1. Moderate segmental left ventricular systolic  dysfunction. EF 40-45$% The inferolateral wall, the  basal-mid  inferior wall, the anterolateral wall, and the  basal inferoseptum are hypokinetic.  2. Normal right ventricular size and low nomal systolic  function.    < end of copied text >

## 2018-03-14 NOTE — PROGRESS NOTE ADULT - ASSESSMENT
43 y/o M with PMH of HLD who presented to OSH with substernal chest pain found to have inferior wall SALLY s/p ENOC x1 to pCIRC abd ENOC x1 to OM1 with IABP in place    # Neuro  - AAO x3 mentating well    # Cardiac  - s/p ENOC to pCIRC (100% stenosis) and ENOC to OM1 with 80% stenosis, s/p IABP removal with site soft and nonfirm  - c/w ASA and brillinta  - c/w lipitor 80QHS  - c/w lisinopril and metoprolol  - TTE: LVEF 40-45% with inferolateral wall, basal-mid inferior wall, anterolateral wall and basal inferoseptum hypokinesis    # Resp  - satting well on RA    # GI  - no GI issues   - transaminitis trending down, will continue to monitor      # Renal  - stable creatinine  - will continue to monitor     #ID  - Afebrile, no evidence of infection    # skin  - s/p IABP removal, groin site appears unremarkable. No fluctuance, bruising or firmness appreciated     Erich Joyce  PGY1  [phone]

## 2018-03-27 ENCOUNTER — APPOINTMENT (OUTPATIENT)
Dept: CARDIOLOGY | Facility: CLINIC | Age: 43
End: 2018-03-27
Payer: COMMERCIAL

## 2018-03-27 VITALS
SYSTOLIC BLOOD PRESSURE: 118 MMHG | HEIGHT: 71 IN | DIASTOLIC BLOOD PRESSURE: 75 MMHG | BODY MASS INDEX: 29.4 KG/M2 | WEIGHT: 210 LBS | OXYGEN SATURATION: 99 % | HEART RATE: 54 BPM

## 2018-03-27 VITALS — DIASTOLIC BLOOD PRESSURE: 68 MMHG | SYSTOLIC BLOOD PRESSURE: 100 MMHG

## 2018-03-27 DIAGNOSIS — Z82.49 FAMILY HISTORY OF ISCHEMIC HEART DISEASE AND OTHER DISEASES OF THE CIRCULATORY SYSTEM: ICD-10-CM

## 2018-03-27 DIAGNOSIS — Z78.9 OTHER SPECIFIED HEALTH STATUS: ICD-10-CM

## 2018-03-27 DIAGNOSIS — F15.90 OTHER STIMULANT USE, UNSPECIFIED, UNCOMPLICATED: ICD-10-CM

## 2018-03-27 DIAGNOSIS — Z82.3 FAMILY HISTORY OF STROKE: ICD-10-CM

## 2018-03-27 DIAGNOSIS — Z83.3 FAMILY HISTORY OF DIABETES MELLITUS: ICD-10-CM

## 2018-03-27 PROCEDURE — 93000 ELECTROCARDIOGRAM COMPLETE: CPT

## 2018-03-27 PROCEDURE — 99215 OFFICE O/P EST HI 40 MIN: CPT

## 2018-03-27 RX ORDER — ASPIRIN 81 MG
81 TABLET, DELAYED RELEASE (ENTERIC COATED) ORAL
Refills: 0 | Status: ACTIVE | COMMUNITY

## 2018-06-28 ENCOUNTER — APPOINTMENT (OUTPATIENT)
Dept: CARDIOLOGY | Facility: CLINIC | Age: 43
End: 2018-06-28
Payer: COMMERCIAL

## 2018-06-28 VITALS
HEART RATE: 60 BPM | SYSTOLIC BLOOD PRESSURE: 118 MMHG | DIASTOLIC BLOOD PRESSURE: 80 MMHG | HEIGHT: 71 IN | WEIGHT: 190 LBS | OXYGEN SATURATION: 99 % | BODY MASS INDEX: 26.6 KG/M2

## 2018-06-28 VITALS — WEIGHT: 187 LBS | BODY MASS INDEX: 26.08 KG/M2

## 2018-06-28 LAB
ALBUMIN SERPL ELPH-MCNC: 5 G/DL
ALP BLD-CCNC: 103 U/L
ALT SERPL-CCNC: 57 U/L
ANION GAP SERPL CALC-SCNC: 15 MMOL/L
AST SERPL-CCNC: 46 U/L
BILIRUB SERPL-MCNC: 1.4 MG/DL
BUN SERPL-MCNC: 19 MG/DL
CALCIUM SERPL-MCNC: 10 MG/DL
CHLORIDE SERPL-SCNC: 104 MMOL/L
CHOLEST SERPL-MCNC: 125 MG/DL
CHOLEST/HDLC SERPL: 2.6 RATIO
CO2 SERPL-SCNC: 24 MMOL/L
CREAT SERPL-MCNC: 1.18 MG/DL
GLUCOSE SERPL-MCNC: 99 MG/DL
HDLC SERPL-MCNC: 49 MG/DL
LDLC SERPL CALC-MCNC: 58 MG/DL
POTASSIUM SERPL-SCNC: 4.3 MMOL/L
PROT SERPL-MCNC: 8.7 G/DL
SODIUM SERPL-SCNC: 143 MMOL/L
TRIGL SERPL-MCNC: 88 MG/DL

## 2018-06-28 PROCEDURE — 93000 ELECTROCARDIOGRAM COMPLETE: CPT

## 2018-06-28 PROCEDURE — 99214 OFFICE O/P EST MOD 30 MIN: CPT

## 2019-01-01 NOTE — PATIENT PROFILE ADULT. - NS PRO OT REFERRAL QUES 1 YN
Subjective:       Patient ID: Delmy Severino is a 7 m.o. female.    Vitals:  weight is 7.258 kg (16 lb). Her tympanic temperature is 101.7 °F (38.7 °C) (abnormal). Her pulse is 163 (abnormal). Her oxygen saturation is 100%.     Chief Complaint: Fever    x2 days pt has had congestion, agitation, and  temperature of 100.6-100.7, mother has been alternating ibuprofen. Parents concerned due to traveling soon. Last ibuprofen dose today @12:00.    Fever   This is a new problem. The current episode started in the past 7 days. The problem occurs intermittently. The problem has been gradually worsening. Associated symptoms include congestion and a fever. Pertinent negatives include no chills, coughing, headaches, myalgias, rash, sore throat or vomiting. Nothing aggravates the symptoms. She has tried NSAIDs for the symptoms. The treatment provided mild relief.       Constitution: Positive for fever. Negative for appetite change and chills.   HENT: Positive for congestion. Negative for ear pain and sore throat.    Neck: Negative for painful lymph nodes.   Eyes: Negative for eye discharge and eye redness.   Respiratory: Negative for cough.    Gastrointestinal: Negative for vomiting and diarrhea.   Genitourinary: Negative for dysuria.   Musculoskeletal: Negative for muscle ache.   Skin: Negative for rash.   Neurological: Negative for headaches and seizures.   Hematologic/Lymphatic: Negative for swollen lymph nodes.   Psychiatric/Behavioral: Positive for agitation.       Objective:      Physical Exam   Constitutional: She appears well-developed and well-nourished. She is active. No distress.   HENT:   Head: Normocephalic and atraumatic. Anterior fontanelle is flat. No hematoma. No signs of injury.   Right Ear: External ear, pinna and canal normal. Tympanic membrane is injected, erythematous and bulging.   Left Ear: External ear, pinna and canal normal. Tympanic membrane is injected, erythematous and bulging.   Nose: Nose  normal. No rhinorrhea or nasal discharge. No signs of injury.   Mouth/Throat: Mucous membranes are moist. Dental tenderness (teething) present. Oropharynx is clear.   Eyes: Red reflex is present bilaterally. Visual tracking is normal. Pupils are equal, round, and reactive to light. Conjunctivae and lids are normal. Right eye exhibits no discharge. Left eye exhibits no discharge. No scleral icterus.   Neck: Trachea normal and normal range of motion. Neck supple. No tenderness is present.   Cardiovascular: Normal rate and regular rhythm.   Pulmonary/Chest: Effort normal and breath sounds normal. No nasal flaring. No respiratory distress. She has no wheezes. She exhibits no retraction.   Abdominal: Soft. Bowel sounds are normal. She exhibits no distension. There is no tenderness.   Musculoskeletal: Normal range of motion. She exhibits no tenderness or deformity.   Lymphadenopathy:     She has no cervical adenopathy.   Neurological: She is alert. She has normal strength and normal reflexes. Suck normal.   Skin: Skin is warm, dry, not diaphoretic, not pale, no rash and not purpuric. Capillary refill takes less than 2 seconds. Turgor is normal. petechiaecyanosis  Nursing note and vitals reviewed.        Assessment:       1. Acute otitis media, bilateral    2. Fever, unspecified fever cause        Plan:         Acute otitis media, bilateral  -     amoxicillin-clavulanate (AUGMENTIN) 125-31.25 mg/5 mL suspension; Take 5 mLs (125 mg total) by mouth every 12 (twelve) hours. for 10 days  Dispense: 100 mL; Refill: 0    Fever, unspecified fever cause  -     POCT Influenza A/B      Patient Instructions     Acute Otitis Media with Infection (Child)    Your child has a middle ear infection (acute otitis media). It is caused by bacteria or fungi. The middle ear is the space behind the eardrum. The eustachian tube connects the ear to the nasal passage. The eustachian tubes help drain fluid from the ears. They also keep the air  pressure equal inside and outside the ears. These tubes are shorter and more horizontal in children. This makes it more likely for the tubes to become blocked. A blockage lets fluid and pressure build up in the middle ear. Bacteria or fungi can grow in this fluid and cause an ear infection. This infection is commonly known as an earache.  The main symptom of an ear infection is ear pain. Other symptoms may include pulling at the ear, being more fussy than usual, decreased appetite, and vomiting or diarrhea. Your childs hearing may also be affected. Your child may have had a respiratory infection first.  An ear infection may clear up on its own. Or your child may need to take medicine. After the infection goes away, your child may still have fluid in the middle ear. It may take weeks or months for this fluid to go away. During that time, your child may have temporary hearing loss. But all other symptoms of the earache should be gone.  Home care  Follow these guidelines when caring for your child at home:  · The healthcare provider will likely prescribe medicines for pain. The provider may also prescribe antibiotics or antifungals to treat the infection. These may be liquid medicines to give by mouth. Or they may be ear drops. Follow the providers instructions for giving these medicines to your child.  · Because ear infections can clear up on their own, the provider may suggest waiting for a few days before giving your child medicines for infection.  · To reduce pain, have your child rest in an upright position. Hot or cold compresses held against the ear may help ease pain.  · Keep the ear dry. Have your child wear a shower cap when bathing.  To help prevent future infections:  · Avoid smoking near your child. Secondhand smoke raises the risk for ear infections in children.  · Make sure your child gets all appropriate vaccines.  · Do not bottle-feed while your baby is lying on his or her back. (This position can  cause middle ear infections because it allows milk to run into the eustachian tubes.)      · If you breastfeed, continue until your child is 6 to 12 months of age.  To apply ear drops:  1. Put the bottle in warm water if the medicine is kept in the refrigerator. Cold drops in the ear are uncomfortable.  2. Have your child lie down on a flat surface. Gently hold your childs head to one side.  3. Remove any drainage from the ear with a clean tissue or cotton swab. Clean only the outer ear. Dont put the cotton swab into the ear canal.  4. Straighten the ear canal by gently pulling the earlobe up and back.  5. Keep the dropper a half-inch above the ear canal. This will keep the dropper from becoming contaminated. Put the drops against the side of the ear canal.  6. Have your child stay lying down for 2 to 3 minutes. This gives time for the medicine to enter the ear canal. If your child doesnt have pain, gently massage the outer ear near the opening.  7. Wipe any extra medicine away from the outer ear with a clean cotton ball.  Follow-up care  Follow up with your childs healthcare provider as directed. Your child will need to have the ear rechecked to make sure the infection has resolved. Check with your doctor to see when they want to see your child.  Special note to parents  If your child continues to get earaches, he or she may need ear tubes. The provider will put small tubes in your childs eardrum to help keep fluid from building up. This procedure is a simple and works well.  When to seek medical advice  Unless advised otherwise, call your child's healthcare provider if:  · Your child is 3 months old or younger and has a fever of 100.4°F (38°C) or higher. Your child may need to see a healthcare provider.  · Your child is of any age and has fevers higher than 104°F (40°C) that come back again and again.  Call your child's healthcare provider for any of the following:  · New symptoms, especially swelling around  the ear or weakness of face muscles  · Severe pain  · Infection seems to get worse, not better   · Neck pain  · Your child acts very sick or not himself or herself  · Fever or pain do not improve with antibiotics after 48 hours  Date Last Reviewed: 5/3/2015  © 9159-6033 [employer]. [address] [place] [address], [place] [address]. All rights reserved. This information is not intended as a substitute for professional medical care. Always follow your healthcare professional's instructions.    Alternate ibuprofen and tylenol every 3 hours.  Steam bath  Nasal suction  Vicks on chest and feet  Plenty of fluids to prevent dehydration      If you were prescribed a narcotic medication, do not drive or operate heavy equipment or machinery while taking these medications.  You must understand that you've received an Urgent Care treatment only and that you may be released before all your medical problems are known or treated. You, the patient, will arrange for follow up care as instructed.  Follow up with your PCP or specialty clinic as directed in the next 1-2 weeks if not improved or as needed.  You can call [phone] to schedule an appointment with the appropriate provider.  If your condition worsens we recommend that you receive another evaluation at the emergency room immediately or contact your primary medical clinics after hours call service to discuss your concerns.    Please return here or go to the Emergency Department for any concerns or worsening of condition.    If you have been referred to another provider and wish to call to check on the status of your referral, please call [facility] Scheduling at [phone]              no

## 2019-01-08 ENCOUNTER — NON-APPOINTMENT (OUTPATIENT)
Age: 44
End: 2019-01-08

## 2019-01-08 ENCOUNTER — APPOINTMENT (OUTPATIENT)
Dept: CARDIOLOGY | Facility: CLINIC | Age: 44
End: 2019-01-08
Payer: COMMERCIAL

## 2019-01-08 VITALS
WEIGHT: 195 LBS | HEART RATE: 61 BPM | OXYGEN SATURATION: 100 % | BODY MASS INDEX: 27.2 KG/M2 | DIASTOLIC BLOOD PRESSURE: 75 MMHG | SYSTOLIC BLOOD PRESSURE: 125 MMHG

## 2019-01-08 PROCEDURE — 93000 ELECTROCARDIOGRAM COMPLETE: CPT

## 2019-01-08 PROCEDURE — 99214 OFFICE O/P EST MOD 30 MIN: CPT

## 2019-01-08 NOTE — HISTORY OF PRESENT ILLNESS
[FreeTextEntry1] : Jesse has been feeling his heart skipping beats recently at different times of the day. Wife states he is not eating right either. 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

## 2019-01-08 NOTE — DISCUSSION/SUMMARY
[___ Month(s)] : [unfilled] month(s) [FreeTextEntry1] : The patient is a 43-year-old gentleman dyslipidemia s/p inferolateral MI Ef=45% now with PVCs that are symptomatic.\par #1 PVC- labs today, holter and nuclear stress test\par #2 CAD - no angina or HF, on optimal meds\par #2 Lipids- on atorvastatin 80mg, lipid panel today\par #3 General- We discussed adherence to a low fat, low cholesterol diet, losing weight again and regular daily exercise.\par

## 2019-01-08 NOTE — REVIEW OF SYSTEMS
[Negative] : Heme/Lymph [Recent Weight Gain (___ Lbs)] : recent [unfilled] ~Ulb weight gain [Shortness Of Breath] : no shortness of breath [Dyspnea on exertion] : not dyspnea during exertion [Chest Pain] : no chest pain [Lower Ext Edema] : no extremity edema [Palpitations] : no palpitations

## 2019-01-08 NOTE — PHYSICAL EXAM
[General Appearance - Well Developed] : well developed [Normal Appearance] : normal appearance [Well Groomed] : well groomed [General Appearance - Well Nourished] : well nourished [No Deformities] : no deformities [General Appearance - In No Acute Distress] : no acute distress [Normal Conjunctiva] : the conjunctiva exhibited no abnormalities [Eyelids - No Xanthelasma] : the eyelids demonstrated no xanthelasmas [Normal Oral Mucosa] : normal oral mucosa [No Oral Pallor] : no oral pallor [No Oral Cyanosis] : no oral cyanosis [Normal Jugular Venous A Waves Present] : normal jugular venous A waves present [Normal Jugular Venous V Waves Present] : normal jugular venous V waves present [No Jugular Venous Da Silva A Waves] : no jugular venous da silva A waves [Respiration, Rhythm And Depth] : normal respiratory rhythm and effort [Exaggerated Use Of Accessory Muscles For Inspiration] : no accessory muscle use [Auscultation Breath Sounds / Voice Sounds] : lungs were clear to auscultation bilaterally [Heart Rate And Rhythm] : heart rate and rhythm were normal [Heart Sounds] : normal S1 and S2 [Murmurs] : no murmurs present [Abdomen Soft] : soft [Abdomen Tenderness] : non-tender [Abdomen Mass (___ Cm)] : no abdominal mass palpated [Abnormal Walk] : normal gait [Gait - Sufficient For Exercise Testing] : the gait was sufficient for exercise testing [Nail Clubbing] : no clubbing of the fingernails [Cyanosis, Localized] : no localized cyanosis [Petechial Hemorrhages (___cm)] : no petechial hemorrhages [Skin Color & Pigmentation] : normal skin color and pigmentation [] : no rash [No Venous Stasis] : no venous stasis [Skin Lesions] : no skin lesions [No Skin Ulcers] : no skin ulcer [No Xanthoma] : no  xanthoma was observed [Oriented To Time, Place, And Person] : oriented to person, place, and time [Affect] : the affect was normal [Mood] : the mood was normal [No Anxiety] : not feeling anxious

## 2019-01-09 LAB
25(OH)D3 SERPL-MCNC: 22.4 NG/ML
ALBUMIN SERPL ELPH-MCNC: 4.1 G/DL
ALP BLD-CCNC: 78 U/L
ALT SERPL-CCNC: 38 U/L
ANION GAP SERPL CALC-SCNC: 11 MMOL/L
AST SERPL-CCNC: 31 U/L
BASOPHILS # BLD AUTO: 0.01 K/UL
BASOPHILS NFR BLD AUTO: 0.1 %
BILIRUB SERPL-MCNC: 0.7 MG/DL
BUN SERPL-MCNC: 13 MG/DL
CALCIUM SERPL-MCNC: 9.6 MG/DL
CHLORIDE SERPL-SCNC: 103 MMOL/L
CHOLEST SERPL-MCNC: 116 MG/DL
CHOLEST/HDLC SERPL: 3.1 RATIO
CO2 SERPL-SCNC: 29 MMOL/L
CREAT SERPL-MCNC: 0.8 MG/DL
EOSINOPHIL # BLD AUTO: 0.12 K/UL
EOSINOPHIL NFR BLD AUTO: 1.3 %
GLUCOSE SERPL-MCNC: 70 MG/DL
HBA1C MFR BLD HPLC: 5.6 %
HCT VFR BLD CALC: 43.7 %
HDLC SERPL-MCNC: 37 MG/DL
HGB BLD-MCNC: 13.9 G/DL
IMM GRANULOCYTES NFR BLD AUTO: 0.4 %
LDLC SERPL CALC-MCNC: 56 MG/DL
LYMPHOCYTES # BLD AUTO: 2.1 K/UL
LYMPHOCYTES NFR BLD AUTO: 22.2 %
MAN DIFF?: NORMAL
MCHC RBC-ENTMCNC: 28.3 PG
MCHC RBC-ENTMCNC: 31.8 GM/DL
MCV RBC AUTO: 89 FL
MONOCYTES # BLD AUTO: 0.66 K/UL
MONOCYTES NFR BLD AUTO: 7 %
NEUTROPHILS # BLD AUTO: 6.51 K/UL
NEUTROPHILS NFR BLD AUTO: 69 %
PLATELET # BLD AUTO: 198 K/UL
POTASSIUM SERPL-SCNC: 3.9 MMOL/L
PROT SERPL-MCNC: 7.7 G/DL
RBC # BLD: 4.91 M/UL
RBC # FLD: 14.4 %
SODIUM SERPL-SCNC: 143 MMOL/L
TRIGL SERPL-MCNC: 115 MG/DL
WBC # FLD AUTO: 9.44 K/UL

## 2019-01-10 ENCOUNTER — NON-APPOINTMENT (OUTPATIENT)
Age: 44
End: 2019-01-10

## 2019-01-14 ENCOUNTER — OUTPATIENT (OUTPATIENT)
Dept: OUTPATIENT SERVICES | Facility: HOSPITAL | Age: 44
LOS: 1 days | End: 2019-01-14
Payer: COMMERCIAL

## 2019-01-14 ENCOUNTER — APPOINTMENT (OUTPATIENT)
Dept: CV DIAGNOSTICS | Facility: HOSPITAL | Age: 44
End: 2019-01-14

## 2019-01-14 DIAGNOSIS — I25.10 ATHEROSCLEROTIC HEART DISEASE OF NATIVE CORONARY ARTERY WITHOUT ANGINA PECTORIS: ICD-10-CM

## 2019-01-14 PROCEDURE — 78452 HT MUSCLE IMAGE SPECT MULT: CPT | Mod: 26

## 2019-01-14 PROCEDURE — A9500: CPT

## 2019-01-14 PROCEDURE — 78452 HT MUSCLE IMAGE SPECT MULT: CPT

## 2019-01-14 PROCEDURE — 93016 CV STRESS TEST SUPVJ ONLY: CPT

## 2019-01-14 PROCEDURE — 93018 CV STRESS TEST I&R ONLY: CPT

## 2019-01-14 PROCEDURE — 93017 CV STRESS TEST TRACING ONLY: CPT

## 2019-04-11 ENCOUNTER — MEDICATION RENEWAL (OUTPATIENT)
Age: 44
End: 2019-04-11

## 2019-05-07 ENCOUNTER — NON-APPOINTMENT (OUTPATIENT)
Age: 44
End: 2019-05-07

## 2019-05-07 ENCOUNTER — APPOINTMENT (OUTPATIENT)
Dept: CARDIOLOGY | Facility: CLINIC | Age: 44
End: 2019-05-07
Payer: COMMERCIAL

## 2019-05-07 VITALS
SYSTOLIC BLOOD PRESSURE: 119 MMHG | WEIGHT: 196 LBS | HEART RATE: 60 BPM | HEIGHT: 71 IN | BODY MASS INDEX: 27.44 KG/M2 | OXYGEN SATURATION: 100 % | DIASTOLIC BLOOD PRESSURE: 79 MMHG

## 2019-05-07 PROCEDURE — 93000 ELECTROCARDIOGRAM COMPLETE: CPT

## 2019-05-07 PROCEDURE — 99214 OFFICE O/P EST MOD 30 MIN: CPT

## 2019-05-07 NOTE — DISCUSSION/SUMMARY
[___ Month(s)] : [unfilled] month(s) [FreeTextEntry1] : The patient is a 43-year-old gentleman dyslipidemia s/p inferolateral MI Ef=45% , PVC that have resolved.\par #1 PVC- resolved on toprol PVC\par #2 CAD - no angina or HF, on optimal meds\par #2 Lipids- on atorvastatin 80mg, lipids optimal\par #3 General- We discussed adherence to a low fat, low cholesterol diet, losing weight again and regular daily exercise.\par

## 2019-05-07 NOTE — HISTORY OF PRESENT ILLNESS
[FreeTextEntry1] : Jesse palpitations resolved. He thinks it was from nyquil and dayquil. 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

## 2019-05-07 NOTE — PHYSICAL EXAM
[General Appearance - Well Developed] : well developed [Normal Appearance] : normal appearance [Well Groomed] : well groomed [General Appearance - Well Nourished] : well nourished [General Appearance - In No Acute Distress] : no acute distress [No Deformities] : no deformities [Normal Conjunctiva] : the conjunctiva exhibited no abnormalities [Eyelids - No Xanthelasma] : the eyelids demonstrated no xanthelasmas [Normal Oral Mucosa] : normal oral mucosa [No Oral Pallor] : no oral pallor [No Oral Cyanosis] : no oral cyanosis [Normal Jugular Venous A Waves Present] : normal jugular venous A waves present [Normal Jugular Venous V Waves Present] : normal jugular venous V waves present [No Jugular Venous Da Silva A Waves] : no jugular venous da silva A waves [Exaggerated Use Of Accessory Muscles For Inspiration] : no accessory muscle use [Respiration, Rhythm And Depth] : normal respiratory rhythm and effort [Auscultation Breath Sounds / Voice Sounds] : lungs were clear to auscultation bilaterally [Heart Rate And Rhythm] : heart rate and rhythm were normal [Murmurs] : no murmurs present [Heart Sounds] : normal S1 and S2 [Abdomen Tenderness] : non-tender [Abdomen Soft] : soft [Abdomen Mass (___ Cm)] : no abdominal mass palpated [Abnormal Walk] : normal gait [Gait - Sufficient For Exercise Testing] : the gait was sufficient for exercise testing [Cyanosis, Localized] : no localized cyanosis [Nail Clubbing] : no clubbing of the fingernails [Petechial Hemorrhages (___cm)] : no petechial hemorrhages [Skin Color & Pigmentation] : normal skin color and pigmentation [] : no rash [No Venous Stasis] : no venous stasis [Skin Lesions] : no skin lesions [No Skin Ulcers] : no skin ulcer [No Xanthoma] : no  xanthoma was observed [Affect] : the affect was normal [Oriented To Time, Place, And Person] : oriented to person, place, and time [Mood] : the mood was normal [No Anxiety] : not feeling anxious

## 2019-05-07 NOTE — REVIEW OF SYSTEMS
[Recent Weight Gain (___ Lbs)] : recent [unfilled] ~Ulb weight gain [Negative] : Heme/Lymph [Shortness Of Breath] : no shortness of breath [Dyspnea on exertion] : not dyspnea during exertion [Chest Pain] : no chest pain [Lower Ext Edema] : no extremity edema [Palpitations] : no palpitations

## 2019-11-07 ENCOUNTER — APPOINTMENT (OUTPATIENT)
Dept: CARDIOLOGY | Facility: CLINIC | Age: 44
End: 2019-11-07
Payer: COMMERCIAL

## 2019-11-07 ENCOUNTER — NON-APPOINTMENT (OUTPATIENT)
Age: 44
End: 2019-11-07

## 2019-11-07 VITALS
HEIGHT: 71 IN | OXYGEN SATURATION: 98 % | SYSTOLIC BLOOD PRESSURE: 117 MMHG | DIASTOLIC BLOOD PRESSURE: 75 MMHG | WEIGHT: 215 LBS | HEART RATE: 54 BPM | BODY MASS INDEX: 30.1 KG/M2

## 2019-11-07 LAB
ALBUMIN SERPL ELPH-MCNC: 5 G/DL
ALP BLD-CCNC: 80 U/L
ALT SERPL-CCNC: 36 U/L
ANION GAP SERPL CALC-SCNC: 12 MMOL/L
AST SERPL-CCNC: 30 U/L
BASOPHILS # BLD AUTO: 0.02 K/UL
BASOPHILS NFR BLD AUTO: 0.3 %
BILIRUB SERPL-MCNC: 1.2 MG/DL
BUN SERPL-MCNC: 18 MG/DL
CALCIUM SERPL-MCNC: 9.7 MG/DL
CHLORIDE SERPL-SCNC: 105 MMOL/L
CHOLEST SERPL-MCNC: 159 MG/DL
CHOLEST/HDLC SERPL: 3.7 RATIO
CO2 SERPL-SCNC: 25 MMOL/L
CREAT SERPL-MCNC: 0.9 MG/DL
EOSINOPHIL # BLD AUTO: 0.13 K/UL
EOSINOPHIL NFR BLD AUTO: 2.2 %
ESTIMATED AVERAGE GLUCOSE: 120 MG/DL
GLUCOSE SERPL-MCNC: 108 MG/DL
HBA1C MFR BLD HPLC: 5.8 %
HCT VFR BLD CALC: 44.7 %
HDLC SERPL-MCNC: 43 MG/DL
HGB BLD-MCNC: 14.3 G/DL
IMM GRANULOCYTES NFR BLD AUTO: 0.8 %
LDLC SERPL CALC-MCNC: 85 MG/DL
LYMPHOCYTES # BLD AUTO: 2.23 K/UL
LYMPHOCYTES NFR BLD AUTO: 37.2 %
MAN DIFF?: NORMAL
MCHC RBC-ENTMCNC: 28.1 PG
MCHC RBC-ENTMCNC: 32 GM/DL
MCV RBC AUTO: 87.8 FL
MONOCYTES # BLD AUTO: 0.4 K/UL
MONOCYTES NFR BLD AUTO: 6.7 %
NEUTROPHILS # BLD AUTO: 3.16 K/UL
NEUTROPHILS NFR BLD AUTO: 52.8 %
PLATELET # BLD AUTO: 163 K/UL
POTASSIUM SERPL-SCNC: 4.1 MMOL/L
PROT SERPL-MCNC: 7.4 G/DL
RBC # BLD: 5.09 M/UL
RBC # FLD: 13.6 %
SODIUM SERPL-SCNC: 142 MMOL/L
TRIGL SERPL-MCNC: 153 MG/DL
WBC # FLD AUTO: 5.99 K/UL

## 2019-11-07 PROCEDURE — 99214 OFFICE O/P EST MOD 30 MIN: CPT

## 2019-11-07 PROCEDURE — 93000 ELECTROCARDIOGRAM COMPLETE: CPT

## 2019-11-07 RX ORDER — TICAGRELOR 90 MG/1
90 TABLET ORAL
Qty: 180 | Refills: 3 | Status: DISCONTINUED | COMMUNITY
End: 2019-11-07

## 2019-11-07 NOTE — REVIEW OF SYSTEMS
[Negative] : Endocrine [Recent Weight Gain (___ Lbs)] : recent [unfilled] ~Ulb weight gain [Shortness Of Breath] : no shortness of breath [Dyspnea on exertion] : not dyspnea during exertion [Chest Pain] : no chest pain [Lower Ext Edema] : no extremity edema [Palpitations] : no palpitations

## 2019-11-07 NOTE — DISCUSSION/SUMMARY
[___ Month(s)] : [unfilled] month(s) [FreeTextEntry1] : The patient is a 44-year-old gentleman dyslipidemia s/p inferolateral MI Ef=45% , PVC who is gaining a lot of weight.\par #1 PVC- resolved on toprol PVC\par #2 CAD - no angina or HF, on optimal meds\par #2 Lipids- on atorvastatin 80mg, lipids today\par #3 General- We discussed adherence to a low fat, low cholesterol diet, losing weight again and regular daily exercise.\par

## 2019-11-07 NOTE — PHYSICAL EXAM
[General Appearance - Well Developed] : well developed [Normal Appearance] : normal appearance [Well Groomed] : well groomed [General Appearance - Well Nourished] : well nourished [No Deformities] : no deformities [Eyelids - No Xanthelasma] : the eyelids demonstrated no xanthelasmas [Normal Conjunctiva] : the conjunctiva exhibited no abnormalities [General Appearance - In No Acute Distress] : no acute distress [Normal Oral Mucosa] : normal oral mucosa [No Oral Pallor] : no oral pallor [Normal Jugular Venous V Waves Present] : normal jugular venous V waves present [No Oral Cyanosis] : no oral cyanosis [Normal Jugular Venous A Waves Present] : normal jugular venous A waves present [Respiration, Rhythm And Depth] : normal respiratory rhythm and effort [No Jugular Venous Da Silva A Waves] : no jugular venous da silva A waves [Exaggerated Use Of Accessory Muscles For Inspiration] : no accessory muscle use [Auscultation Breath Sounds / Voice Sounds] : lungs were clear to auscultation bilaterally [Heart Rate And Rhythm] : heart rate and rhythm were normal [Heart Sounds] : normal S1 and S2 [Abdomen Tenderness] : non-tender [Abdomen Soft] : soft [Murmurs] : no murmurs present [Abnormal Walk] : normal gait [Abdomen Mass (___ Cm)] : no abdominal mass palpated [Gait - Sufficient For Exercise Testing] : the gait was sufficient for exercise testing [Nail Clubbing] : no clubbing of the fingernails [Cyanosis, Localized] : no localized cyanosis [Petechial Hemorrhages (___cm)] : no petechial hemorrhages [] : no rash [Skin Color & Pigmentation] : normal skin color and pigmentation [No Venous Stasis] : no venous stasis [Skin Lesions] : no skin lesions [No Xanthoma] : no  xanthoma was observed [No Skin Ulcers] : no skin ulcer [No Anxiety] : not feeling anxious [Mood] : the mood was normal [Affect] : the affect was normal [Oriented To Time, Place, And Person] : oriented to person, place, and time

## 2019-11-07 NOTE — HISTORY OF PRESENT ILLNESS
[FreeTextEntry1] : Jesse has gained a lot of weight since last visit. Just ran out of simvastatin.  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

## 2020-03-18 ENCOUNTER — EMERGENCY (EMERGENCY)
Facility: HOSPITAL | Age: 45
LOS: 1 days | Discharge: HOME | End: 2020-03-18
Attending: EMERGENCY MEDICINE
Payer: COMMERCIAL

## 2020-03-18 ENCOUNTER — TRANSCRIPTION ENCOUNTER (OUTPATIENT)
Age: 45
End: 2020-03-18

## 2020-03-18 VITALS
SYSTOLIC BLOOD PRESSURE: 150 MMHG | DIASTOLIC BLOOD PRESSURE: 97 MMHG | HEART RATE: 60 BPM | TEMPERATURE: 98 F | OXYGEN SATURATION: 98 % | RESPIRATION RATE: 16 BRPM | HEIGHT: 71 IN | WEIGHT: 210.1 LBS

## 2020-03-18 VITALS
DIASTOLIC BLOOD PRESSURE: 68 MMHG | SYSTOLIC BLOOD PRESSURE: 126 MMHG | OXYGEN SATURATION: 95 % | RESPIRATION RATE: 18 BRPM | HEART RATE: 62 BPM | TEMPERATURE: 98 F

## 2020-03-18 LAB
ALBUMIN SERPL ELPH-MCNC: 4.3 G/DL — SIGNIFICANT CHANGE UP (ref 3.3–5)
ALP SERPL-CCNC: 78 U/L — SIGNIFICANT CHANGE UP (ref 40–120)
ALT FLD-CCNC: 38 U/L — SIGNIFICANT CHANGE UP (ref 10–45)
ANION GAP SERPL CALC-SCNC: 14 MMOL/L — SIGNIFICANT CHANGE UP (ref 5–17)
APPEARANCE UR: CLEAR — SIGNIFICANT CHANGE UP
AST SERPL-CCNC: 34 U/L — SIGNIFICANT CHANGE UP (ref 10–40)
BACTERIA # UR AUTO: NEGATIVE — SIGNIFICANT CHANGE UP
BILIRUB SERPL-MCNC: 0.6 MG/DL — SIGNIFICANT CHANGE UP (ref 0.2–1.2)
BILIRUB UR-MCNC: NEGATIVE — SIGNIFICANT CHANGE UP
BUN SERPL-MCNC: 15 MG/DL — SIGNIFICANT CHANGE UP (ref 7–23)
CALCIUM SERPL-MCNC: 9.1 MG/DL — SIGNIFICANT CHANGE UP (ref 8.4–10.5)
CHLORIDE SERPL-SCNC: 105 MMOL/L — SIGNIFICANT CHANGE UP (ref 96–108)
CO2 SERPL-SCNC: 22 MMOL/L — SIGNIFICANT CHANGE UP (ref 22–31)
COLOR SPEC: SIGNIFICANT CHANGE UP
CREAT SERPL-MCNC: 0.91 MG/DL — SIGNIFICANT CHANGE UP (ref 0.5–1.3)
DIFF PNL FLD: ABNORMAL
EPI CELLS # UR: 0 /HPF — SIGNIFICANT CHANGE UP
GLUCOSE SERPL-MCNC: 136 MG/DL — HIGH (ref 70–99)
GLUCOSE UR QL: NEGATIVE — SIGNIFICANT CHANGE UP
HCT VFR BLD CALC: 43.7 % — SIGNIFICANT CHANGE UP (ref 39–50)
HGB BLD-MCNC: 14 G/DL — SIGNIFICANT CHANGE UP (ref 13–17)
HYALINE CASTS # UR AUTO: 0 /LPF — SIGNIFICANT CHANGE UP (ref 0–2)
KETONES UR-MCNC: NEGATIVE — SIGNIFICANT CHANGE UP
LEUKOCYTE ESTERASE UR-ACNC: NEGATIVE — SIGNIFICANT CHANGE UP
MCHC RBC-ENTMCNC: 27.9 PG — SIGNIFICANT CHANGE UP (ref 27–34)
MCHC RBC-ENTMCNC: 32 GM/DL — SIGNIFICANT CHANGE UP (ref 32–36)
MCV RBC AUTO: 87.2 FL — SIGNIFICANT CHANGE UP (ref 80–100)
NITRITE UR-MCNC: NEGATIVE — SIGNIFICANT CHANGE UP
NRBC # BLD: 0 /100 WBCS — SIGNIFICANT CHANGE UP (ref 0–0)
PH UR: 6 — SIGNIFICANT CHANGE UP (ref 5–8)
PLATELET # BLD AUTO: 160 K/UL — SIGNIFICANT CHANGE UP (ref 150–400)
POTASSIUM SERPL-MCNC: 3.7 MMOL/L — SIGNIFICANT CHANGE UP (ref 3.5–5.3)
POTASSIUM SERPL-SCNC: 3.7 MMOL/L — SIGNIFICANT CHANGE UP (ref 3.5–5.3)
PROT SERPL-MCNC: 7.3 G/DL — SIGNIFICANT CHANGE UP (ref 6–8.3)
PROT UR-MCNC: SIGNIFICANT CHANGE UP
RBC # BLD: 5.01 M/UL — SIGNIFICANT CHANGE UP (ref 4.2–5.8)
RBC # FLD: 13.4 % — SIGNIFICANT CHANGE UP (ref 10.3–14.5)
RBC CASTS # UR COMP ASSIST: 281 /HPF — HIGH (ref 0–4)
SODIUM SERPL-SCNC: 141 MMOL/L — SIGNIFICANT CHANGE UP (ref 135–145)
SP GR SPEC: 1.02 — SIGNIFICANT CHANGE UP (ref 1.01–1.02)
UROBILINOGEN FLD QL: NEGATIVE — SIGNIFICANT CHANGE UP
WBC # BLD: 6.57 K/UL — SIGNIFICANT CHANGE UP (ref 3.8–10.5)
WBC # FLD AUTO: 6.57 K/UL — SIGNIFICANT CHANGE UP (ref 3.8–10.5)
WBC UR QL: 2 /HPF — SIGNIFICANT CHANGE UP (ref 0–5)

## 2020-03-18 PROCEDURE — 74176 CT ABD & PELVIS W/O CONTRAST: CPT

## 2020-03-18 PROCEDURE — 96374 THER/PROPH/DIAG INJ IV PUSH: CPT

## 2020-03-18 PROCEDURE — 81001 URINALYSIS AUTO W/SCOPE: CPT

## 2020-03-18 PROCEDURE — 74176 CT ABD & PELVIS W/O CONTRAST: CPT | Mod: 26

## 2020-03-18 PROCEDURE — 99284 EMERGENCY DEPT VISIT MOD MDM: CPT | Mod: 25

## 2020-03-18 PROCEDURE — 87086 URINE CULTURE/COLONY COUNT: CPT

## 2020-03-18 PROCEDURE — 99285 EMERGENCY DEPT VISIT HI MDM: CPT

## 2020-03-18 PROCEDURE — 85027 COMPLETE CBC AUTOMATED: CPT

## 2020-03-18 PROCEDURE — 80053 COMPREHEN METABOLIC PANEL: CPT

## 2020-03-18 RX ORDER — IBUPROFEN 200 MG
400 TABLET ORAL ONCE
Refills: 0 | Status: COMPLETED | OUTPATIENT
Start: 2020-03-18 | End: 2020-03-18

## 2020-03-18 RX ORDER — OXYCODONE AND ACETAMINOPHEN 5; 325 MG/1; MG/1
1 TABLET ORAL ONCE
Refills: 0 | Status: DISCONTINUED | OUTPATIENT
Start: 2020-03-18 | End: 2020-03-18

## 2020-03-18 RX ORDER — KETOROLAC TROMETHAMINE 30 MG/ML
15 SYRINGE (ML) INJECTION ONCE
Refills: 0 | Status: DISCONTINUED | OUTPATIENT
Start: 2020-03-18 | End: 2020-03-18

## 2020-03-18 RX ORDER — SODIUM CHLORIDE 9 MG/ML
1000 INJECTION INTRAMUSCULAR; INTRAVENOUS; SUBCUTANEOUS ONCE
Refills: 0 | Status: COMPLETED | OUTPATIENT
Start: 2020-03-18 | End: 2020-03-18

## 2020-03-18 RX ADMIN — OXYCODONE AND ACETAMINOPHEN 1 TABLET(S): 5; 325 TABLET ORAL at 05:59

## 2020-03-18 RX ADMIN — Medication 400 MILLIGRAM(S): at 06:33

## 2020-03-18 RX ADMIN — SODIUM CHLORIDE 1000 MILLILITER(S): 9 INJECTION INTRAMUSCULAR; INTRAVENOUS; SUBCUTANEOUS at 04:47

## 2020-03-18 RX ADMIN — Medication 15 MILLIGRAM(S): at 04:46

## 2020-03-18 NOTE — ED ADULT NURSE NOTE - OBJECTIVE STATEMENT
45 yo M pmh of HTN, HLD, CAD with stents ambulated to ED c/o left sided flank pain radiating to the LLQ and groin starting tonight.  Pt states that he had pain 6 days ago that resolved on its own, but developed hematuria.  Pt states sudden onset of worsening of pain staring tonight.  Denies CP, back pain, SOB, fevers/chills, n/v/d, lightheadedness, dizziness, changes in bowel habits. A&Ox4, abdomen soft, nondistended, tender to palpation LLQ.  +CVA tenderness to the left side. Skin w/d/i.  Safety and comfort maintained.  IV established in left AC.  will continue to monitor.

## 2020-03-18 NOTE — ED PROVIDER NOTE - PATIENT PORTAL LINK FT
You can access the FollowMyHealth Patient Portal offered by [facility] by registering at the following website: [email or web address]. By joining [employer]’s FollowMyHealth portal, you will also be able to view your health information using other applications (apps) compatible with our system.

## 2020-03-18 NOTE — ED PROVIDER NOTE - NSFOLLOWUPINSTRUCTIONS_ED_ALL_ED_FT
You were seen in the emergency department for flank pain and were found to have a kidney stone.     Please follow-up with your primary care doctor in the next 24-48 hours.     Please take 600mg Ibuprofen (also known as Advil) every 6 hours as directed on the bottle for pain control.     If you have any worsening symptoms, severe chest pain, abdominal pain, nausea, vomiting, or you develop a fever, please return to the emergency department.

## 2020-03-18 NOTE — ED PROVIDER NOTE - CLINICAL SUMMARY MEDICAL DECISION MAKING FREE TEXT BOX
44M presenting with left sided flank pain. has hematuria. no fever or dysuria. concern for kidney stone. plan for labs, fluids, CT, pain control. 44M presenting with left sided flank pain. has hematuria. no fever or dysuria. concern for kidney stone. plan for labs, fluids, CT, pain control.  Pablo: 44 year old male with left sided flank pain, hematuria. will give pain control, ivf, ct a/p r/o stone. will reassess

## 2020-03-18 NOTE — ED PROVIDER NOTE - PHYSICAL EXAMINATION
General: uncomfortable appearing male, no acute distress   HEENT: normocephalic, atraumatic   Respiratory: normal work of breathing, lungs clear to auscultation bilaterally   Cardiac: regular rate and rhythm   Abdomen: soft, non-tender, no guarding or rebound   MSK: no swelling or tenderness of lower extremities, moving all extremities spontaneously   Skin: warm, dry   Neuro: A&Ox3  Psych: appropriate affect

## 2020-03-18 NOTE — ED PROVIDER NOTE - NSFOLLOWUPCLINICS_GEN_ALL_ED_FT
[facility] - Urology  Urology  [address] [place] [address]  Phone: [phone]  Fax:   Follow Up Time:

## 2020-03-18 NOTE — ED PROVIDER NOTE - OBJECTIVE STATEMENT
44M, pmh of htn, hld, CAD with stents, presenting with flank pain. patient reports left sided flank pain 6 days ago that resolved, but then had blood in urine. tonight the pain returned and was significantly worse. is radiating from his left back down to his groin. denies any testicular pain, swelling or penile discharge. no similar symptoms in the past. no history of kidney stones. denies fever, chest pain, nausea, vomiting, difficulty breathing, pain or burning with urination.

## 2020-03-18 NOTE — ED PROVIDER NOTE - PROGRESS NOTE DETAILS
updated patient on results. reports symptoms improved. will discharge with urology follow-up. - resident Naveen Lawrence

## 2020-03-19 LAB
CULTURE RESULTS: SIGNIFICANT CHANGE UP
SPECIMEN SOURCE: SIGNIFICANT CHANGE UP

## 2020-06-25 ENCOUNTER — APPOINTMENT (OUTPATIENT)
Dept: CARDIOLOGY | Facility: CLINIC | Age: 45
End: 2020-06-25
Payer: COMMERCIAL

## 2020-06-25 ENCOUNTER — NON-APPOINTMENT (OUTPATIENT)
Age: 45
End: 2020-06-25

## 2020-06-25 VITALS
WEIGHT: 218 LBS | HEART RATE: 57 BPM | SYSTOLIC BLOOD PRESSURE: 121 MMHG | DIASTOLIC BLOOD PRESSURE: 80 MMHG | BODY MASS INDEX: 30.52 KG/M2 | OXYGEN SATURATION: 97 % | HEIGHT: 71 IN

## 2020-06-25 LAB
25(OH)D3 SERPL-MCNC: 31.8 NG/ML
ALBUMIN SERPL ELPH-MCNC: 4.8 G/DL
ALP BLD-CCNC: 76 U/L
ALT SERPL-CCNC: 38 U/L
ANION GAP SERPL CALC-SCNC: 13 MMOL/L
AST SERPL-CCNC: 36 U/L
BASOPHILS # BLD AUTO: 0.03 K/UL
BASOPHILS NFR BLD AUTO: 0.5 %
BILIRUB SERPL-MCNC: 0.8 MG/DL
BUN SERPL-MCNC: 17 MG/DL
CALCIUM SERPL-MCNC: 9.3 MG/DL
CHLORIDE SERPL-SCNC: 103 MMOL/L
CHOLEST SERPL-MCNC: 156 MG/DL
CHOLEST/HDLC SERPL: 3.6 RATIO
CO2 SERPL-SCNC: 24 MMOL/L
CREAT SERPL-MCNC: 0.87 MG/DL
EOSINOPHIL # BLD AUTO: 0.13 K/UL
EOSINOPHIL NFR BLD AUTO: 2 %
ESTIMATED AVERAGE GLUCOSE: 117 MG/DL
GLUCOSE SERPL-MCNC: 105 MG/DL
HBA1C MFR BLD HPLC: 5.7 %
HCT VFR BLD CALC: 42.7 %
HDLC SERPL-MCNC: 44 MG/DL
HGB BLD-MCNC: 14 G/DL
IMM GRANULOCYTES NFR BLD AUTO: 1.1 %
LDLC SERPL CALC-MCNC: 75 MG/DL
LYMPHOCYTES # BLD AUTO: 2.21 K/UL
LYMPHOCYTES NFR BLD AUTO: 34.6 %
MAN DIFF?: NORMAL
MCHC RBC-ENTMCNC: 28.6 PG
MCHC RBC-ENTMCNC: 32.8 GM/DL
MCV RBC AUTO: 87.1 FL
MONOCYTES # BLD AUTO: 0.44 K/UL
MONOCYTES NFR BLD AUTO: 6.9 %
NEUTROPHILS # BLD AUTO: 3.5 K/UL
NEUTROPHILS NFR BLD AUTO: 54.9 %
PLATELET # BLD AUTO: 194 K/UL
POTASSIUM SERPL-SCNC: 4.3 MMOL/L
PROT SERPL-MCNC: 7.4 G/DL
RBC # BLD: 4.9 M/UL
RBC # FLD: 14.8 %
SARS-COV-2 IGG SERPL IA-ACNC: <0.2 RATIO
SARS-COV-2 IGG SERPL QL IA: NEGATIVE
SODIUM SERPL-SCNC: 140 MMOL/L
TRIGL SERPL-MCNC: 184 MG/DL
WBC # FLD AUTO: 6.38 K/UL

## 2020-06-25 PROCEDURE — 99214 OFFICE O/P EST MOD 30 MIN: CPT

## 2020-06-25 NOTE — HISTORY OF PRESENT ILLNESS
[FreeTextEntry1] : Jesse has been trying to walk for exercise. He thought he lost weight.  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

## 2020-06-25 NOTE — PHYSICAL EXAM
[Well Groomed] : well groomed [General Appearance - Well Developed] : well developed [Normal Appearance] : normal appearance [General Appearance - In No Acute Distress] : no acute distress [No Deformities] : no deformities [General Appearance - Well Nourished] : well nourished [Normal Conjunctiva] : the conjunctiva exhibited no abnormalities [Normal Oral Mucosa] : normal oral mucosa [Eyelids - No Xanthelasma] : the eyelids demonstrated no xanthelasmas [No Oral Cyanosis] : no oral cyanosis [No Oral Pallor] : no oral pallor [Normal Jugular Venous V Waves Present] : normal jugular venous V waves present [Normal Jugular Venous A Waves Present] : normal jugular venous A waves present [No Jugular Venous Da Silva A Waves] : no jugular venous da silva A waves [Exaggerated Use Of Accessory Muscles For Inspiration] : no accessory muscle use [Auscultation Breath Sounds / Voice Sounds] : lungs were clear to auscultation bilaterally [Respiration, Rhythm And Depth] : normal respiratory rhythm and effort [Murmurs] : no murmurs present [Heart Sounds] : normal S1 and S2 [Heart Rate And Rhythm] : heart rate and rhythm were normal [Abdomen Tenderness] : non-tender [Abdomen Soft] : soft [Abdomen Mass (___ Cm)] : no abdominal mass palpated [Abnormal Walk] : normal gait [Gait - Sufficient For Exercise Testing] : the gait was sufficient for exercise testing [Nail Clubbing] : no clubbing of the fingernails [Cyanosis, Localized] : no localized cyanosis [Petechial Hemorrhages (___cm)] : no petechial hemorrhages [Skin Color & Pigmentation] : normal skin color and pigmentation [] : no rash [No Venous Stasis] : no venous stasis [Skin Lesions] : no skin lesions [No Skin Ulcers] : no skin ulcer [No Xanthoma] : no  xanthoma was observed [Affect] : the affect was normal [Mood] : the mood was normal [Oriented To Time, Place, And Person] : oriented to person, place, and time [No Anxiety] : not feeling anxious

## 2020-06-25 NOTE — DISCUSSION/SUMMARY
[___ Month(s)] : [unfilled] month(s) [FreeTextEntry1] : The patient is a 44-year-old gentleman dyslipidemia s/p inferolateral MI Ef=45% , PVC who continues to gain weight.\par #1 PVC- resolved on toprol 12.5mg\par #2 CAD - no angina or HF, on optimal meds, if glucose not increased, then can stop brilinta\par #2 Lipids- on atorvastatin 80mg, lipids today\par #3 General- We discussed adherence to a low fat, low cholesterol diet, losing weight again and regular daily exercise.\par

## 2020-12-10 ENCOUNTER — APPOINTMENT (OUTPATIENT)
Dept: CARDIOLOGY | Facility: CLINIC | Age: 45
End: 2020-12-10
Payer: COMMERCIAL

## 2020-12-10 ENCOUNTER — NON-APPOINTMENT (OUTPATIENT)
Age: 45
End: 2020-12-10

## 2020-12-10 VITALS
SYSTOLIC BLOOD PRESSURE: 119 MMHG | HEIGHT: 71 IN | OXYGEN SATURATION: 97 % | BODY MASS INDEX: 30.8 KG/M2 | WEIGHT: 220 LBS | DIASTOLIC BLOOD PRESSURE: 80 MMHG | HEART RATE: 78 BPM

## 2020-12-10 PROCEDURE — 99214 OFFICE O/P EST MOD 30 MIN: CPT

## 2020-12-10 PROCEDURE — 99072 ADDL SUPL MATRL&STAF TM PHE: CPT

## 2020-12-10 RX ORDER — TICAGRELOR 60 MG/1
60 TABLET ORAL TWICE DAILY
Qty: 180 | Refills: 1 | Status: DISCONTINUED | COMMUNITY
Start: 2019-05-07 | End: 2020-12-10

## 2020-12-11 LAB
25(OH)D3 SERPL-MCNC: 28.5 NG/ML
ALBUMIN SERPL ELPH-MCNC: 4.6 G/DL
ALP BLD-CCNC: 90 U/L
ALT SERPL-CCNC: 45 U/L
ANION GAP SERPL CALC-SCNC: 9 MMOL/L
AST SERPL-CCNC: 32 U/L
BASOPHILS # BLD AUTO: 0.03 K/UL
BASOPHILS NFR BLD AUTO: 0.4 %
BILIRUB SERPL-MCNC: 1.3 MG/DL
BUN SERPL-MCNC: 13 MG/DL
CALCIUM SERPL-MCNC: 9.8 MG/DL
CHLORIDE SERPL-SCNC: 103 MMOL/L
CHOLEST SERPL-MCNC: 135 MG/DL
CO2 SERPL-SCNC: 30 MMOL/L
CREAT SERPL-MCNC: 1.05 MG/DL
EOSINOPHIL # BLD AUTO: 0.13 K/UL
EOSINOPHIL NFR BLD AUTO: 1.9 %
ESTIMATED AVERAGE GLUCOSE: 117 MG/DL
GLUCOSE SERPL-MCNC: 175 MG/DL
HBA1C MFR BLD HPLC: 5.7 %
HCT VFR BLD CALC: 47.5 %
HDLC SERPL-MCNC: 36 MG/DL
HGB BLD-MCNC: 14.8 G/DL
IMM GRANULOCYTES NFR BLD AUTO: 0.4 %
LDLC SERPL CALC-MCNC: 52 MG/DL
LYMPHOCYTES # BLD AUTO: 1.89 K/UL
LYMPHOCYTES NFR BLD AUTO: 28.1 %
MAN DIFF?: NORMAL
MCHC RBC-ENTMCNC: 27.8 PG
MCHC RBC-ENTMCNC: 31.2 GM/DL
MCV RBC AUTO: 89.1 FL
MONOCYTES # BLD AUTO: 0.34 K/UL
MONOCYTES NFR BLD AUTO: 5.1 %
NEUTROPHILS # BLD AUTO: 4.31 K/UL
NEUTROPHILS NFR BLD AUTO: 64.1 %
NONHDLC SERPL-MCNC: 99 MG/DL
PLATELET # BLD AUTO: 173 K/UL
POTASSIUM SERPL-SCNC: 3.8 MMOL/L
PROT SERPL-MCNC: 7.2 G/DL
RBC # BLD: 5.33 M/UL
RBC # FLD: 14.3 %
SODIUM SERPL-SCNC: 142 MMOL/L
TRIGL SERPL-MCNC: 234 MG/DL
WBC # FLD AUTO: 6.73 K/UL

## 2020-12-11 PROCEDURE — 99072 ADDL SUPL MATRL&STAF TM PHE: CPT

## 2020-12-11 PROCEDURE — 99214 OFFICE O/P EST MOD 30 MIN: CPT

## 2020-12-12 NOTE — HISTORY OF PRESENT ILLNESS
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 No chest pain, palpitations or shortness of breath.

## 2020-12-12 NOTE — DISCUSSION/SUMMARY
[FreeTextEntry1] : The patient is a 45-year-old gentleman dyslipidemia s/p inferolateral MI Ef=45% , PVC who continues to gain weight.\par #1 PVC- resolved on toprol 12.5mg\par #2 CAD - no angina or HF, on optimal meds, can stop brilinta\par #3 Lipids- on atorvastatin 80mg, lipids today\par #4 Prediabetes- labs today\par #5 General- We discussed adherence to a low fat, low cholesterol diet, losing weight again and regular daily exercise.\par

## 2021-06-10 ENCOUNTER — NON-APPOINTMENT (OUTPATIENT)
Age: 46
End: 2021-06-10

## 2021-06-10 ENCOUNTER — APPOINTMENT (OUTPATIENT)
Dept: CARDIOLOGY | Facility: CLINIC | Age: 46
End: 2021-06-10
Payer: COMMERCIAL

## 2021-06-10 VITALS
HEIGHT: 71 IN | SYSTOLIC BLOOD PRESSURE: 116 MMHG | WEIGHT: 224 LBS | DIASTOLIC BLOOD PRESSURE: 75 MMHG | BODY MASS INDEX: 31.36 KG/M2 | HEART RATE: 51 BPM | OXYGEN SATURATION: 98 %

## 2021-06-10 PROCEDURE — 99214 OFFICE O/P EST MOD 30 MIN: CPT

## 2021-06-10 PROCEDURE — 93000 ELECTROCARDIOGRAM COMPLETE: CPT

## 2021-06-10 PROCEDURE — 99072 ADDL SUPL MATRL&STAF TM PHE: CPT

## 2021-06-12 NOTE — PHYSICAL EXAM
[Well Developed] : well developed [Well Nourished] : well nourished [No Acute Distress] : no acute distress [Normal Conjunctiva] : normal conjunctiva [Normal Venous Pressure] : normal venous pressure [No Carotid Bruit] : no carotid bruit [Normal S1, S2] : normal S1, S2 [No Murmur] : no murmur [No Rub] : no rub [No Gallop] : no gallop [Clear Lung Fields] : clear lung fields [Good Air Entry] : good air entry [No Respiratory Distress] : no respiratory distress  [Soft] : abdomen soft [No Masses/organomegaly] : no masses/organomegaly [Non Tender] : non-tender [Normal Bowel Sounds] : normal bowel sounds [Normal Gait] : normal gait [No Edema] : no edema [No Cyanosis] : no cyanosis [No Clubbing] : no clubbing [No Varicosities] : no varicosities [No Rash] : no rash [No Skin Lesions] : no skin lesions [Moves all extremities] : moves all extremities [No Focal Deficits] : no focal deficits [Normal Speech] : normal speech [Alert and Oriented] : alert and oriented [Normal memory] : normal memory

## 2021-06-12 NOTE — HISTORY OF PRESENT ILLNESS
[FreeTextEntry1] : Jesse has been feeling well with no chest pain palpitations or shortness of breath. Gaining weight and not exercising.

## 2021-06-12 NOTE — REVIEW OF SYSTEMS
[SOB] : no shortness of breath [Dyspnea on exertion] : not dyspnea during exertion [Chest Discomfort] : no chest discomfort [Lower Ext Edema] : no extremity edema [Leg Claudication] : no intermittent leg claudication [Palpitations] : no palpitations [Syncope] : no syncope [Negative] : Heme/Lymph

## 2021-06-12 NOTE — DISCUSSION/SUMMARY
[___ Month(s)] : in [unfilled] month(s) [FreeTextEntry1] : The patient is a 45-year-old gentleman dyslipidemia s/p inferolateral MI Ef=45% , PVC who continues to gain weight.\par #1 PVC- resolved, continue toprol 12.5mg\par #2 CAD - no angina or HF, continue aspirin, stop brilinta\par #3 Lipids- continue atorvastatin 80mg, lipids next visit\par #4 Prediabetes- \par #5 General- We discussed adherence to a low fat, low cholesterol diet, losing weight  and regular daily exercise. Received Moderna vaccines. \par

## 2021-12-06 ENCOUNTER — APPOINTMENT (OUTPATIENT)
Dept: CV DIAGNOSITCS | Facility: HOSPITAL | Age: 46
End: 2021-12-06

## 2022-01-06 ENCOUNTER — APPOINTMENT (OUTPATIENT)
Dept: CARDIOLOGY | Facility: CLINIC | Age: 47
End: 2022-01-06

## 2022-01-20 ENCOUNTER — APPOINTMENT (OUTPATIENT)
Dept: CARDIOLOGY | Facility: CLINIC | Age: 47
End: 2022-01-20
Payer: COMMERCIAL

## 2022-01-20 DIAGNOSIS — R00.2 PALPITATIONS: ICD-10-CM

## 2022-01-20 PROCEDURE — 93306 TTE W/DOPPLER COMPLETE: CPT

## 2022-01-27 ENCOUNTER — APPOINTMENT (OUTPATIENT)
Dept: CARDIOLOGY | Facility: CLINIC | Age: 47
End: 2022-01-27
Payer: COMMERCIAL

## 2022-01-27 ENCOUNTER — NON-APPOINTMENT (OUTPATIENT)
Age: 47
End: 2022-01-27

## 2022-01-27 VITALS
SYSTOLIC BLOOD PRESSURE: 112 MMHG | DIASTOLIC BLOOD PRESSURE: 72 MMHG | HEART RATE: 52 BPM | OXYGEN SATURATION: 98 % | BODY MASS INDEX: 29.4 KG/M2 | HEIGHT: 71 IN | WEIGHT: 210 LBS

## 2022-01-27 LAB
25(OH)D3 SERPL-MCNC: 29.3 NG/ML
ALBUMIN SERPL ELPH-MCNC: 5 G/DL
ALP BLD-CCNC: 108 U/L
ALT SERPL-CCNC: 34 U/L
ANION GAP SERPL CALC-SCNC: 13 MMOL/L
AST SERPL-CCNC: 29 U/L
BILIRUB SERPL-MCNC: 1.4 MG/DL
BUN SERPL-MCNC: 17 MG/DL
CALCIUM SERPL-MCNC: 9.6 MG/DL
CHLORIDE SERPL-SCNC: 103 MMOL/L
CHOLEST SERPL-MCNC: 131 MG/DL
CO2 SERPL-SCNC: 26 MMOL/L
CREAT SERPL-MCNC: 0.91 MG/DL
GLUCOSE SERPL-MCNC: 94 MG/DL
HDLC SERPL-MCNC: 36 MG/DL
LDLC SERPL CALC-MCNC: 68 MG/DL
NONHDLC SERPL-MCNC: 95 MG/DL
POTASSIUM SERPL-SCNC: 4.3 MMOL/L
PROT SERPL-MCNC: 7.8 G/DL
SODIUM SERPL-SCNC: 142 MMOL/L
TRIGL SERPL-MCNC: 133 MG/DL

## 2022-01-27 PROCEDURE — 99214 OFFICE O/P EST MOD 30 MIN: CPT

## 2022-01-27 PROCEDURE — 93000 ELECTROCARDIOGRAM COMPLETE: CPT

## 2022-01-28 LAB
BASOPHILS # BLD AUTO: 0.03 K/UL
BASOPHILS NFR BLD AUTO: 0.5 %
EOSINOPHIL # BLD AUTO: 0.15 K/UL
EOSINOPHIL NFR BLD AUTO: 2.5 %
ESTIMATED AVERAGE GLUCOSE: 120 MG/DL
HBA1C MFR BLD HPLC: 5.8 %
HCT VFR BLD CALC: 45.8 %
HGB BLD-MCNC: 14.7 G/DL
IMM GRANULOCYTES NFR BLD AUTO: 0.3 %
LYMPHOCYTES # BLD AUTO: 2.27 K/UL
LYMPHOCYTES NFR BLD AUTO: 37.6 %
MAN DIFF?: NORMAL
MCHC RBC-ENTMCNC: 28.1 PG
MCHC RBC-ENTMCNC: 32.1 GM/DL
MCV RBC AUTO: 87.6 FL
MONOCYTES # BLD AUTO: 0.35 K/UL
MONOCYTES NFR BLD AUTO: 5.8 %
NEUTROPHILS # BLD AUTO: 3.21 K/UL
NEUTROPHILS NFR BLD AUTO: 53.3 %
PLATELET # BLD AUTO: 200 K/UL
RBC # BLD: 5.23 M/UL
RBC # FLD: 13.5 %
WBC # FLD AUTO: 6.03 K/UL

## 2022-01-29 PROBLEM — R00.2 PALPITATIONS: Status: ACTIVE | Noted: 2019-11-07

## 2022-02-15 NOTE — HISTORY OF PRESENT ILLNESS
[FreeTextEntry1] : Jesse lost the weight he gained. He had an episode of palpitations after coffee yesterday lasting 30min. Nothing since. No associated lightheadedness or dizziness. UTI and does not drink enough water. No regular exercise.

## 2022-02-15 NOTE — DISCUSSION/SUMMARY
[___ Month(s)] : in [unfilled] month(s) [FreeTextEntry1] : The patient is a 46-year-old gentleman dyslipidemia, CAD, PVC with episode palpitations after coffee.\par #1 PVC- resolved, continue toprol 12.5mg\par #2 CAD - IWMI, no angina or HF, continue aspirin\par #3 Lipids- continue atorvastatin 80mg, lipids next visit\par #4 Prediabetes- labs today\par #5 General- We discussed adherence to a low fat, low cholesterol diet, losing weight  and regular daily exercise. Received Moderna vaccines. \par 2/15/22 Addendum: There are no cardiac contraindications to emergency laparoscopic cholecystectomy on aspirin.

## 2022-02-16 ENCOUNTER — TRANSCRIPTION ENCOUNTER (OUTPATIENT)
Age: 47
End: 2022-02-16

## 2022-02-16 ENCOUNTER — OUTPATIENT (OUTPATIENT)
Dept: OUTPATIENT SERVICES | Facility: HOSPITAL | Age: 47
LOS: 1 days | End: 2022-02-16
Payer: COMMERCIAL

## 2022-02-16 VITALS
TEMPERATURE: 98 F | RESPIRATION RATE: 14 BRPM | HEART RATE: 77 BPM | WEIGHT: 209.44 LBS | DIASTOLIC BLOOD PRESSURE: 77 MMHG | SYSTOLIC BLOOD PRESSURE: 116 MMHG | HEIGHT: 69.5 IN | OXYGEN SATURATION: 98 %

## 2022-02-16 DIAGNOSIS — K80.20 CALCULUS OF GALLBLADDER WITHOUT CHOLECYSTITIS WITHOUT OBSTRUCTION: ICD-10-CM

## 2022-02-16 DIAGNOSIS — Z01.818 ENCOUNTER FOR OTHER PREPROCEDURAL EXAMINATION: ICD-10-CM

## 2022-02-16 LAB
ALBUMIN SERPL ELPH-MCNC: 3.8 G/DL — SIGNIFICANT CHANGE UP (ref 3.3–5)
ALP SERPL-CCNC: 95 U/L — SIGNIFICANT CHANGE UP (ref 30–120)
ALT FLD-CCNC: 38 U/L DA — SIGNIFICANT CHANGE UP (ref 10–60)
ANION GAP SERPL CALC-SCNC: 9 MMOL/L — SIGNIFICANT CHANGE UP (ref 5–17)
AST SERPL-CCNC: 20 U/L — SIGNIFICANT CHANGE UP (ref 10–40)
BILIRUB SERPL-MCNC: 2.6 MG/DL — HIGH (ref 0.2–1.2)
BUN SERPL-MCNC: 13 MG/DL — SIGNIFICANT CHANGE UP (ref 7–23)
CALCIUM SERPL-MCNC: 8.6 MG/DL — SIGNIFICANT CHANGE UP (ref 8.4–10.5)
CHLORIDE SERPL-SCNC: 102 MMOL/L — SIGNIFICANT CHANGE UP (ref 96–108)
CO2 SERPL-SCNC: 28 MMOL/L — SIGNIFICANT CHANGE UP (ref 22–31)
CREAT SERPL-MCNC: 1.04 MG/DL — SIGNIFICANT CHANGE UP (ref 0.5–1.3)
GLUCOSE SERPL-MCNC: 113 MG/DL — HIGH (ref 70–99)
HCT VFR BLD CALC: 44.4 % — SIGNIFICANT CHANGE UP (ref 39–50)
HGB BLD-MCNC: 14.2 G/DL — SIGNIFICANT CHANGE UP (ref 13–17)
MCHC RBC-ENTMCNC: 27.8 PG — SIGNIFICANT CHANGE UP (ref 27–34)
MCHC RBC-ENTMCNC: 32 GM/DL — SIGNIFICANT CHANGE UP (ref 32–36)
MCV RBC AUTO: 86.9 FL — SIGNIFICANT CHANGE UP (ref 80–100)
NRBC # BLD: 0 /100 WBCS — SIGNIFICANT CHANGE UP (ref 0–0)
PLATELET # BLD AUTO: 164 K/UL — SIGNIFICANT CHANGE UP (ref 150–400)
POTASSIUM SERPL-MCNC: 3.7 MMOL/L — SIGNIFICANT CHANGE UP (ref 3.5–5.3)
POTASSIUM SERPL-SCNC: 3.7 MMOL/L — SIGNIFICANT CHANGE UP (ref 3.5–5.3)
PROT SERPL-MCNC: 8 G/DL — SIGNIFICANT CHANGE UP (ref 6–8.3)
RBC # BLD: 5.11 M/UL — SIGNIFICANT CHANGE UP (ref 4.2–5.8)
RBC # FLD: 13.6 % — SIGNIFICANT CHANGE UP (ref 10.3–14.5)
SARS-COV-2 RNA SPEC QL NAA+PROBE: SIGNIFICANT CHANGE UP
SODIUM SERPL-SCNC: 139 MMOL/L — SIGNIFICANT CHANGE UP (ref 135–145)
WBC # BLD: 10.58 K/UL — HIGH (ref 3.8–10.5)
WBC # FLD AUTO: 10.58 K/UL — HIGH (ref 3.8–10.5)

## 2022-02-16 PROCEDURE — G0463: CPT

## 2022-02-16 NOTE — H&P PST ADULT - NSICDXFAMILYHX_GEN_ALL_CORE_FT
FAMILY HISTORY:  Father  Still living? Unknown  Family history of diabetes mellitus, Age at diagnosis: Age Unknown  Family history of MI (myocardial infarction), Age at diagnosis: Age Unknown

## 2022-02-16 NOTE — ASU DISCHARGE PLAN (ADULT/PEDIATRIC) - CARE PROVIDER_API CALL
Leonard Tarango)  Surgery  [address] [place] [address], [address]  [place] [address]  Phone: [phone]  Fax: [phone]  Follow Up Time:

## 2022-02-16 NOTE — H&P PST ADULT - HISTORY OF PRESENT ILLNESS
this is a 47 y/o male who has had epigastric and back pain for about 3-4 days and had vomiting for a few days. tests show gallstone; to have lap micha

## 2022-02-16 NOTE — H&P PST ADULT - NSICDXPASTMEDICALHX_GEN_ALL_CORE_FT
PAST MEDICAL HISTORY:  CAD (coronary artery disease) MI-2018-2 stents    Hyperlipidemia     Kidney stone 2019

## 2022-02-16 NOTE — H&P PST ADULT - NSANTHOSAYNRD_GEN_A_CORE
No. BELLA screening performed.  STOP BANG Legend: 0-2 = LOW Risk; 3-4 = INTERMEDIATE Risk; 5-8 = HIGH Risk

## 2022-02-16 NOTE — ASU DISCHARGE PLAN (ADULT/PEDIATRIC) - NS MD DC FALL RISK RISK
For information on Fall & Injury Prevention, visit: [email or web address] OR  [email or web address] OR  https://www.cdc.gov/steadi/patient.html

## 2022-02-16 NOTE — ASU DISCHARGE PLAN (ADULT/PEDIATRIC) - ASU DC SPECIAL INSTRUCTIONSFT
REST! Apply ice packs to incision sites 20 mins on, 20 mins off every 4 hours for the first 24 hours.    If taking narcotic pain medications, may take COLACE (OTC stool softener) as directed to prevent constipation.    Increase ambulation and utilize incentive spirometer as directed.    Please call Dr. PRUDENCE Tarango's office ([phone]) to schedule a follow-up appointment to be seen in 7-10 days. REST! Apply ice packs to incision sites 20 mins on, 20 mins off every 4 hours for the first 24 hours.    If taking narcotic pain medications, may take COLACE (OTC stool softener) as directed to prevent constipation.    Increase ambulation and utilize incentive spirometer as directed.  Empty and record Manuel-Woodall (PACHECO) drainage output as instructed 2-3 x a day.      Please call Dr. PRUDENCE Tarango's office ([phone]) to schedule a follow-up appointment to be seen tomorrow, Friday, February 18.

## 2022-02-17 ENCOUNTER — OUTPATIENT (OUTPATIENT)
Dept: OUTPATIENT SERVICES | Facility: HOSPITAL | Age: 47
LOS: 1 days | End: 2022-02-17
Payer: COMMERCIAL

## 2022-02-17 ENCOUNTER — RESULT REVIEW (OUTPATIENT)
Age: 47
End: 2022-02-17

## 2022-02-17 VITALS
SYSTOLIC BLOOD PRESSURE: 111 MMHG | DIASTOLIC BLOOD PRESSURE: 69 MMHG | RESPIRATION RATE: 18 BRPM | OXYGEN SATURATION: 97 % | HEART RATE: 66 BPM | TEMPERATURE: 98 F

## 2022-02-17 VITALS
OXYGEN SATURATION: 100 % | TEMPERATURE: 98 F | HEART RATE: 70 BPM | DIASTOLIC BLOOD PRESSURE: 76 MMHG | WEIGHT: 204.15 LBS | SYSTOLIC BLOOD PRESSURE: 120 MMHG | HEIGHT: 70 IN | RESPIRATION RATE: 18 BRPM

## 2022-02-17 DIAGNOSIS — K80.20 CALCULUS OF GALLBLADDER WITHOUT CHOLECYSTITIS WITHOUT OBSTRUCTION: ICD-10-CM

## 2022-02-17 DIAGNOSIS — Z01.818 ENCOUNTER FOR OTHER PREPROCEDURAL EXAMINATION: ICD-10-CM

## 2022-02-17 LAB — ABO RH CONFIRMATION: SIGNIFICANT CHANGE UP

## 2022-02-17 PROCEDURE — 36415 COLL VENOUS BLD VENIPUNCTURE: CPT

## 2022-02-17 PROCEDURE — 47562 LAPAROSCOPIC CHOLECYSTECTOMY: CPT

## 2022-02-17 PROCEDURE — 88304 TISSUE EXAM BY PATHOLOGIST: CPT | Mod: 26

## 2022-02-17 PROCEDURE — 88304 TISSUE EXAM BY PATHOLOGIST: CPT

## 2022-02-17 PROCEDURE — 47562 LAPAROSCOPIC CHOLECYSTECTOMY: CPT | Mod: AS

## 2022-02-17 PROCEDURE — C1889: CPT

## 2022-02-17 DEVICE — CLIP APPLIER COVIDIEN ENDOCLIP II 10MM MED/LG: Type: IMPLANTABLE DEVICE | Status: FUNCTIONAL

## 2022-02-17 DEVICE — CLIP APPLIER COVIDIEN ENDOCLIP 10MM LARGE: Type: IMPLANTABLE DEVICE | Status: FUNCTIONAL

## 2022-02-17 RX ORDER — CEFAZOLIN SODIUM 1 G
2000 VIAL (EA) INJECTION ONCE
Refills: 0 | Status: COMPLETED | OUTPATIENT
Start: 2022-02-17 | End: 2022-02-17

## 2022-02-17 RX ORDER — SODIUM CHLORIDE 9 MG/ML
1000 INJECTION, SOLUTION INTRAVENOUS
Refills: 0 | Status: DISCONTINUED | OUTPATIENT
Start: 2022-02-17 | End: 2022-02-18

## 2022-02-17 RX ORDER — ACETAMINOPHEN 500 MG
2 TABLET ORAL
Qty: 0 | Refills: 0 | DISCHARGE

## 2022-02-17 RX ORDER — HYDROMORPHONE HYDROCHLORIDE 2 MG/ML
0.5 INJECTION INTRAMUSCULAR; INTRAVENOUS; SUBCUTANEOUS
Refills: 0 | Status: DISCONTINUED | OUTPATIENT
Start: 2022-02-17 | End: 2022-02-18

## 2022-02-17 RX ORDER — CHLORHEXIDINE GLUCONATE 213 G/1000ML
1 SOLUTION TOPICAL ONCE
Refills: 0 | Status: COMPLETED | OUTPATIENT
Start: 2022-02-17 | End: 2022-02-17

## 2022-02-17 RX ORDER — ONDANSETRON 8 MG/1
4 TABLET, FILM COATED ORAL ONCE
Refills: 0 | Status: COMPLETED | OUTPATIENT
Start: 2022-02-17 | End: 2022-02-17

## 2022-02-17 RX ORDER — APREPITANT 80 MG/1
40 CAPSULE ORAL ONCE
Refills: 0 | Status: COMPLETED | OUTPATIENT
Start: 2022-02-17 | End: 2022-02-17

## 2022-02-17 RX ORDER — OXYCODONE HYDROCHLORIDE 5 MG/1
5 TABLET ORAL ONCE
Refills: 0 | Status: DISCONTINUED | OUTPATIENT
Start: 2022-02-17 | End: 2022-02-18

## 2022-02-17 RX ADMIN — CHLORHEXIDINE GLUCONATE 1 APPLICATION(S): 213 SOLUTION TOPICAL at 10:20

## 2022-02-17 RX ADMIN — ONDANSETRON 4 MILLIGRAM(S): 8 TABLET, FILM COATED ORAL at 14:15

## 2022-02-17 RX ADMIN — SODIUM CHLORIDE 75 MILLILITER(S): 9 INJECTION, SOLUTION INTRAVENOUS at 13:00

## 2022-02-17 RX ADMIN — APREPITANT 40 MILLIGRAM(S): 80 CAPSULE ORAL at 10:20

## 2022-02-17 NOTE — ASU PATIENT PROFILE, ADULT - IS PATIENT PREGNANT?
not applicable (Male) Add Pregnancy Warning To Medication Counseling?: No Plaquenil Monitoring Guidelines: The patient should be seen regularly while taking Plaquenil. The patient should have their visual field evaluated every year for signs of retinopathy. A CBC should be ordered monthly for the first 3 months and then every 4-6 months after that. Cyclosporine Monitoring Guidelines: The patient should be reevaluated in person every two weeks for the first 1-2 months and then monthly. Labs should follow a similar schedule and include: creatinine, BUN, CBC, LFTs, Lipids, magnesium, potassium, and uric acid. Blood pressure should be monitored at every visit as well. Dupixent Monitoring Guidelines: A yearly test for tuberculosis is required while taking Dupixent. Siliq Monitoring Guidelines: A yearly test for tuberculosis is required while taking Siliq. Humira Dosing: 80 mg SC day 0, 40 mg SC day 7, then 40 mg SC every other week Plaquenil Dosing: 200mg daily Cyclosporine Dosing: 2 to 5 mg/kg/day divided BID. The medication should be taken with a glass of water after breakfast and dinner. Remicade Monitoring Guidelines: A yearly test for tuberculosis is required while taking Remicade. A CBC should be ordered every 3-4 months on an ongoing basis while receiving infusions. Taltz Dosing: 160mg SC x 1 at weeks 0 then 80mg SC at weeks 2, 4, 6, 8, 10 and 12 then 80mg SC every four weeks Skyrizi Monitoring Guidelines: A yearly test for tuberculosis is required while taking Skyrizi. Simponi Dosing: 50 mg SC every month Methotrexate Dosing: 12.5mg taken once weekly Enbrel Monitoring Guidelines: A yearly test for tuberculosis is required while taking Enbrel. Pregnancy Warning Text: This medication is not considered safe during pregnancy and precaution should be taken to avoid conception. Xolair Dosing: 300mg SC every 4 weeks Humira Monitoring Guidelines: A yearly test for tuberculosis is required while taking Humira. Cimzia Dosing: 400 mg SC every other week Stelara Monitoring Guidelines: A yearly test for tuberculosis is required while taking Stelara. Ilumya Dosing: 100 mg SC week 0 and week 4 then every 12 weeks thereafter Ilumya Monitoring Guidelines: A yearly test for tuberculosis is required while taking Ilumya. Taltz Monitoring Guidelines: A yearly test for tuberculosis is required while taking Taltz. Lactation Warning Text: This medication is excreted in breast milk. Remicade Dosing: 5mg/kg at week 0,2 and 6 Dupixent Dosing: 600 mg SC day 0 then 300 mg SC every other week Detail Level: Simple Mycophenolate Mofetil Dosing: 500mg twice daily Tremfya Monitoring Guidelines: A yearly test for tuberculosis is required while taking Tremfya. Tremfya Dosing: 100 mg SC week 0 and week 4 then every 8 weeks thereafter Imuran Monitoring Guidelines: The patient should be evaluated monthly for the first 3 months and then once every three months after that. Labs should be drawn twice weekly for the first 2 months and then every 3 months after that. Labs include: CBC and LFTs. Skyrizi Dosing: 150 mg SC week 0 and week 4 then every 12 weeks thereafter Methotrexate Monitoring Guidelines: The patient should be evaluated monthly for the first 3 months and then once every three months after that. Labs should be drawn 1 week after the first dose and then weekly for the first month. Labs should also be drawn 1-2 after raising the dose and before further dose escalations. Eventually labs should be drawn approximately once a quarter. Labs include CBC, LFTs, creatinine, and BUN. Diagnosis (Required): Psoriasis Xeljanz Monitoring Guidelines: The patient should be seen regularly while taking Xeljanz. A CBC and Lipid panel should be checked 4 to 8 weeks after starting therapy and then every 3 months after that. Xolair Monitoring Guidelines: The patient should be monitored during dosing for anaphylaxis. Cosentyx Dosing: 300 mg SC week 0, 1, 2, 3, and 4 then every 4 weeks after that Cimzia Monitoring Guidelines: A yearly test for tuberculosis is required while taking Cimzia. Dmard (Required): Tremfya Otezla Dosing: 10mg qam day 1, 10mg BID day 2, 10mg qam and 20mg day 3, 20mg BID day 4, 20mg qam and 30mg qpm day 5, and 30mg BID on day 6 and thereafter Imuran Dosing: 50mg twice daily Enbrel Dosing: 50 mg SC twice weekly for 3 months then once a week there after Mycophenolate Mofetil Monitoring Guidelines: The patient should be seen frequently at the start of therapy and then every 6 months when stable. A CBC, including platelet count, should be drawn weekly for the first 2-3 months. Starting in the fourth month the CBC should be drawn once a month for the first year. LFTs should be drawn after one month and then once a quarter. Simponi Monitoring Guidelines: A yearly test for tuberculosis is required while taking Simponi. Otezla Monitoring Guidelines: The patient should be monitored regular for depression and weight loss while taking Otezla. BUN and creatinine should be monitored on a regular basis. Xeljanz Dosing: 5 mg taken orally twice daily Siliq Dosing: 210 mg SC at week 0,1 and 2 and then every 2 weeks thereafter Stelara Dosing: 45mg SC at week 0 and 4 and then every 12 weeks thereafter Cosentyx Monitoring Guidelines: A yearly test for tuberculosis is required while taking Cosentyx.

## 2022-02-17 NOTE — ASU PATIENT PROFILE, ADULT - FALL HARM RISK - UNIVERSAL INTERVENTIONS
Bed in lowest position, wheels locked, appropriate side rails in place/Call bell, personal items and telephone in reach/Instruct patient to call for assistance before getting out of bed or chair/Non-slip footwear when patient is out of bed/[place] to call system/Physically safe environment - no spills, clutter or unnecessary equipment/Purposeful Proactive Rounding/Room/bathroom lighting operational, light cord in reach

## 2022-03-14 PROBLEM — I25.10 ATHEROSCLEROTIC HEART DISEASE OF NATIVE CORONARY ARTERY WITHOUT ANGINA PECTORIS: Chronic | Status: ACTIVE | Noted: 2020-03-18

## 2022-03-14 PROBLEM — N20.0 CALCULUS OF KIDNEY: Chronic | Status: ACTIVE | Noted: 2022-02-16

## 2022-04-18 ENCOUNTER — APPOINTMENT (OUTPATIENT)
Dept: CV DIAGNOSTICS | Facility: HOSPITAL | Age: 47
End: 2022-04-18

## 2022-07-06 ENCOUNTER — APPOINTMENT (OUTPATIENT)
Dept: DERMATOLOGY | Facility: CLINIC | Age: 47
End: 2022-07-06

## 2022-07-28 ENCOUNTER — NON-APPOINTMENT (OUTPATIENT)
Age: 47
End: 2022-07-28

## 2022-07-28 ENCOUNTER — APPOINTMENT (OUTPATIENT)
Dept: CARDIOLOGY | Facility: CLINIC | Age: 47
End: 2022-07-28

## 2022-07-28 VITALS
SYSTOLIC BLOOD PRESSURE: 122 MMHG | DIASTOLIC BLOOD PRESSURE: 79 MMHG | WEIGHT: 210 LBS | OXYGEN SATURATION: 98 % | BODY MASS INDEX: 29.4 KG/M2 | HEART RATE: 68 BPM | HEIGHT: 71 IN

## 2022-07-28 LAB
25(OH)D3 SERPL-MCNC: 33.5 NG/ML
ALBUMIN SERPL ELPH-MCNC: 5 G/DL
ALP BLD-CCNC: 105 U/L
ALT SERPL-CCNC: 44 U/L
ANION GAP SERPL CALC-SCNC: 11 MMOL/L
AST SERPL-CCNC: 34 U/L
BASOPHILS # BLD AUTO: 0.02 K/UL
BASOPHILS NFR BLD AUTO: 0.3 %
BILIRUB SERPL-MCNC: 1.4 MG/DL
BUN SERPL-MCNC: 16 MG/DL
CALCIUM SERPL-MCNC: 9.6 MG/DL
CHLORIDE SERPL-SCNC: 105 MMOL/L
CHOLEST SERPL-MCNC: 142 MG/DL
CO2 SERPL-SCNC: 27 MMOL/L
CREAT SERPL-MCNC: 0.91 MG/DL
EGFR: 105 ML/MIN/1.73M2
EOSINOPHIL # BLD AUTO: 0.14 K/UL
EOSINOPHIL NFR BLD AUTO: 1.9 %
ESTIMATED AVERAGE GLUCOSE: 123 MG/DL
GLUCOSE SERPL-MCNC: 103 MG/DL
HBA1C MFR BLD HPLC: 5.9 %
HCT VFR BLD CALC: 45.2 %
HDLC SERPL-MCNC: 42 MG/DL
HGB BLD-MCNC: 14.8 G/DL
IMM GRANULOCYTES NFR BLD AUTO: 0.4 %
LDLC SERPL CALC-MCNC: 76 MG/DL
LYMPHOCYTES # BLD AUTO: 2.45 K/UL
LYMPHOCYTES NFR BLD AUTO: 34.1 %
MAN DIFF?: NORMAL
MCHC RBC-ENTMCNC: 28 PG
MCHC RBC-ENTMCNC: 32.7 GM/DL
MCV RBC AUTO: 85.6 FL
MONOCYTES # BLD AUTO: 0.48 K/UL
MONOCYTES NFR BLD AUTO: 6.7 %
NEUTROPHILS # BLD AUTO: 4.07 K/UL
NEUTROPHILS NFR BLD AUTO: 56.6 %
NONHDLC SERPL-MCNC: 100 MG/DL
PLATELET # BLD AUTO: 204 K/UL
POTASSIUM SERPL-SCNC: 4.4 MMOL/L
PROT SERPL-MCNC: 7.7 G/DL
RBC # BLD: 5.28 M/UL
RBC # FLD: 14.1 %
SODIUM SERPL-SCNC: 142 MMOL/L
TRIGL SERPL-MCNC: 120 MG/DL
WBC # FLD AUTO: 7.19 K/UL

## 2022-07-28 PROCEDURE — 99214 OFFICE O/P EST MOD 30 MIN: CPT | Mod: 25

## 2022-07-28 PROCEDURE — 93000 ELECTROCARDIOGRAM COMPLETE: CPT

## 2022-07-28 RX ORDER — IBUPROFEN 800 MG/1
800 TABLET, FILM COATED ORAL
Qty: 42 | Refills: 0 | Status: DISCONTINUED | COMMUNITY
Start: 2022-01-24

## 2022-07-28 RX ORDER — COVID-19 ANTIGEN TEST
KIT MISCELLANEOUS
Qty: 2 | Refills: 0 | Status: DISCONTINUED | COMMUNITY
Start: 2022-07-22

## 2022-07-28 RX ORDER — OXYCODONE AND ACETAMINOPHEN 5; 325 MG/1; MG/1
5-325 TABLET ORAL
Qty: 12 | Refills: 0 | Status: DISCONTINUED | COMMUNITY
Start: 2022-02-17

## 2022-07-28 NOTE — HISTORY OF PRESENT ILLNESS
[FreeTextEntry1] : Jesse had cholecystectomy in March and had lost 20 pounds in a week. Gained a few back and has been stable for a while. Eating normally and continues to exercise.

## 2022-07-28 NOTE — DISCUSSION/SUMMARY
[EKG obtained to assist in diagnosis and management of assessed problem(s)] : EKG obtained to assist in diagnosis and management of assessed problem(s) [___ Month(s)] : in [unfilled] month(s) [FreeTextEntry1] : The patient is a 46-year-old gentleman dyslipidemia, CAD, PVC s/p cholecystectomy with weight loss. \par #1 PVC- resolved, continue toprol 12.5mg\par #2 CAD - IWMI, no angina or HF, continue aspirin\par #3 Lipids- continue atorvastatin 80mg, lipids and consider decrease atorvastatin\par #4 Prediabetes- labs today\par #5 General- We discussed adherence to a low fat, low cholesterol diet, losing weight  and regular daily exercise. Received Moderna vaccines. \par

## 2023-03-02 ENCOUNTER — APPOINTMENT (OUTPATIENT)
Dept: CARDIOLOGY | Facility: CLINIC | Age: 48
End: 2023-03-02
Payer: COMMERCIAL

## 2023-03-02 VITALS
BODY MASS INDEX: 29.57 KG/M2 | SYSTOLIC BLOOD PRESSURE: 115 MMHG | HEART RATE: 64 BPM | OXYGEN SATURATION: 97 % | DIASTOLIC BLOOD PRESSURE: 77 MMHG | WEIGHT: 212 LBS

## 2023-03-02 PROCEDURE — 93000 ELECTROCARDIOGRAM COMPLETE: CPT

## 2023-03-02 PROCEDURE — 99213 OFFICE O/P EST LOW 20 MIN: CPT | Mod: 25

## 2023-03-03 LAB
25(OH)D3 SERPL-MCNC: 25.2 NG/ML
ALBUMIN SERPL ELPH-MCNC: 5.1 G/DL
ALP BLD-CCNC: 109 U/L
ALT SERPL-CCNC: 58 U/L
ANION GAP SERPL CALC-SCNC: 14 MMOL/L
AST SERPL-CCNC: 48 U/L
BASOPHILS # BLD AUTO: 0.03 K/UL
BASOPHILS NFR BLD AUTO: 0.5 %
BILIRUB SERPL-MCNC: 2.2 MG/DL
BUN SERPL-MCNC: 13 MG/DL
CALCIUM SERPL-MCNC: 10.1 MG/DL
CHLORIDE SERPL-SCNC: 103 MMOL/L
CHOLEST SERPL-MCNC: 128 MG/DL
CO2 SERPL-SCNC: 26 MMOL/L
CREAT SERPL-MCNC: 1.05 MG/DL
EGFR: 88 ML/MIN/1.73M2
EOSINOPHIL # BLD AUTO: 0.12 K/UL
EOSINOPHIL NFR BLD AUTO: 1.9 %
ESTIMATED AVERAGE GLUCOSE: 117 MG/DL
GLUCOSE SERPL-MCNC: 99 MG/DL
HBA1C MFR BLD HPLC: 5.7 %
HCT VFR BLD CALC: 46.5 %
HDLC SERPL-MCNC: 37 MG/DL
HGB BLD-MCNC: 14.9 G/DL
IMM GRANULOCYTES NFR BLD AUTO: 0.5 %
LDLC SERPL CALC-MCNC: 65 MG/DL
LYMPHOCYTES # BLD AUTO: 2.49 K/UL
LYMPHOCYTES NFR BLD AUTO: 39.2 %
MAN DIFF?: NORMAL
MCHC RBC-ENTMCNC: 28.6 PG
MCHC RBC-ENTMCNC: 32 GM/DL
MCV RBC AUTO: 89.3 FL
MONOCYTES # BLD AUTO: 0.38 K/UL
MONOCYTES NFR BLD AUTO: 6 %
NEUTROPHILS # BLD AUTO: 3.3 K/UL
NEUTROPHILS NFR BLD AUTO: 51.9 %
NONHDLC SERPL-MCNC: 91 MG/DL
PLATELET # BLD AUTO: 219 K/UL
POTASSIUM SERPL-SCNC: 4.4 MMOL/L
PROT SERPL-MCNC: 7.9 G/DL
RBC # BLD: 5.21 M/UL
RBC # FLD: 14.1 %
SODIUM SERPL-SCNC: 143 MMOL/L
TRIGL SERPL-MCNC: 129 MG/DL
WBC # FLD AUTO: 6.35 K/UL

## 2023-03-04 NOTE — DISCUSSION/SUMMARY
[FreeTextEntry1] : The patient is a 47-year-old gentleman dyslipidemia, CAD, PVC s/p cholecystectomy now asymptomatic.\par #1 PVC- resolved, continue toprol 12.5mg\par #2 CAD - IWMI, no angina or HF, continue aspirin\par #3 Lipids- continue atorvastatin 80mg, labs today\par #4 Prediabetes- labs today\par #5 General- We discussed adherence to a low fat, low cholesterol diet, losing weight  and regular daily exercise. Received Moderna vaccines. \par  [EKG obtained to assist in diagnosis and management of assessed problem(s)] : EKG obtained to assist in diagnosis and management of assessed problem(s)

## 2023-03-04 NOTE — HISTORY OF PRESENT ILLNESS
[FreeTextEntry1] : Mik has been feeling well with no chest pain, palpitations or shortness of breath.

## 2023-10-10 ENCOUNTER — APPOINTMENT (OUTPATIENT)
Dept: CARDIOLOGY | Facility: CLINIC | Age: 48
End: 2023-10-10
Payer: COMMERCIAL

## 2023-10-10 ENCOUNTER — NON-APPOINTMENT (OUTPATIENT)
Age: 48
End: 2023-10-10

## 2023-10-10 VITALS
SYSTOLIC BLOOD PRESSURE: 121 MMHG | OXYGEN SATURATION: 98 % | HEIGHT: 71 IN | WEIGHT: 212 LBS | HEART RATE: 54 BPM | BODY MASS INDEX: 29.68 KG/M2 | DIASTOLIC BLOOD PRESSURE: 81 MMHG

## 2023-10-10 LAB
25(OH)D3 SERPL-MCNC: 23.5 NG/ML
ALBUMIN SERPL ELPH-MCNC: 4.7 G/DL
ALP BLD-CCNC: 114 U/L
ALT SERPL-CCNC: 42 U/L
ANION GAP SERPL CALC-SCNC: 11 MMOL/L
AST SERPL-CCNC: 25 U/L
BILIRUB SERPL-MCNC: 0.8 MG/DL
BUN SERPL-MCNC: 18 MG/DL
CALCIUM SERPL-MCNC: 9.5 MG/DL
CHLORIDE SERPL-SCNC: 105 MMOL/L
CHOLEST SERPL-MCNC: 121 MG/DL
CO2 SERPL-SCNC: 27 MMOL/L
CREAT SERPL-MCNC: 0.83 MG/DL
EGFR: 107 ML/MIN/1.73M2
ESTIMATED AVERAGE GLUCOSE: 120 MG/DL
GLUCOSE SERPL-MCNC: 93 MG/DL
HBA1C MFR BLD HPLC: 5.8 %
HDLC SERPL-MCNC: 37 MG/DL
LDLC SERPL CALC-MCNC: 57 MG/DL
NONHDLC SERPL-MCNC: 84 MG/DL
POTASSIUM SERPL-SCNC: 4.4 MMOL/L
PROT SERPL-MCNC: 7.1 G/DL
SODIUM SERPL-SCNC: 143 MMOL/L
TRIGL SERPL-MCNC: 160 MG/DL

## 2023-10-10 PROCEDURE — 99213 OFFICE O/P EST LOW 20 MIN: CPT | Mod: 25

## 2023-10-10 PROCEDURE — 93000 ELECTROCARDIOGRAM COMPLETE: CPT

## 2023-10-10 RX ORDER — METOPROLOL SUCCINATE 25 MG/1
25 TABLET, EXTENDED RELEASE ORAL DAILY
Qty: 45 | Refills: 2 | Status: DISCONTINUED | COMMUNITY
Start: 1900-01-01 | End: 2023-10-10

## 2024-04-25 ENCOUNTER — APPOINTMENT (OUTPATIENT)
Dept: CARDIOLOGY | Facility: CLINIC | Age: 49
End: 2024-04-25
Payer: COMMERCIAL

## 2024-04-25 ENCOUNTER — NON-APPOINTMENT (OUTPATIENT)
Age: 49
End: 2024-04-25

## 2024-04-25 VITALS
DIASTOLIC BLOOD PRESSURE: 82 MMHG | OXYGEN SATURATION: 99 % | HEART RATE: 58 BPM | SYSTOLIC BLOOD PRESSURE: 126 MMHG | BODY MASS INDEX: 29.57 KG/M2 | WEIGHT: 212 LBS

## 2024-04-25 DIAGNOSIS — I25.10 ATHEROSCLEROTIC HEART DISEASE OF NATIVE CORONARY ARTERY W/OUT ANGINA PECTORIS: ICD-10-CM

## 2024-04-25 DIAGNOSIS — I49.3 VENTRICULAR PREMATURE DEPOLARIZATION: ICD-10-CM

## 2024-04-25 DIAGNOSIS — E78.5 HYPERLIPIDEMIA, UNSPECIFIED: ICD-10-CM

## 2024-04-25 LAB
25(OH)D3 SERPL-MCNC: 26.6 NG/ML
ALBUMIN SERPL ELPH-MCNC: 4.4 G/DL
ALP BLD-CCNC: 104 U/L
ALT SERPL-CCNC: 29 U/L
ANION GAP SERPL CALC-SCNC: 10 MMOL/L
AST SERPL-CCNC: 26 U/L
BILIRUB SERPL-MCNC: 1 MG/DL
BUN SERPL-MCNC: 14 MG/DL
CALCIUM SERPL-MCNC: 9.2 MG/DL
CHLORIDE SERPL-SCNC: 106 MMOL/L
CHOLEST SERPL-MCNC: 147 MG/DL
CO2 SERPL-SCNC: 26 MMOL/L
CREAT SERPL-MCNC: 0.9 MG/DL
EGFR: 105 ML/MIN/1.73M2
ESTIMATED AVERAGE GLUCOSE: 123 MG/DL
GLUCOSE SERPL-MCNC: 96 MG/DL
HBA1C MFR BLD HPLC: 5.9 %
HCT VFR BLD CALC: 43.3 %
HDLC SERPL-MCNC: 42 MG/DL
HGB BLD-MCNC: 14.1 G/DL
LDLC SERPL CALC-MCNC: 79 MG/DL
MCHC RBC-ENTMCNC: 28.1 PG
MCHC RBC-ENTMCNC: 32.6 GM/DL
MCV RBC AUTO: 86.3 FL
NONHDLC SERPL-MCNC: 106 MG/DL
PLATELET # BLD AUTO: 181 K/UL
POTASSIUM SERPL-SCNC: 4 MMOL/L
PROT SERPL-MCNC: 7 G/DL
RBC # BLD: 5.02 M/UL
RBC # FLD: 13.6 %
SODIUM SERPL-SCNC: 142 MMOL/L
TRIGL SERPL-MCNC: 151 MG/DL
WBC # FLD AUTO: 6.18 K/UL

## 2024-04-25 PROCEDURE — 99214 OFFICE O/P EST MOD 30 MIN: CPT | Mod: 25

## 2024-04-25 PROCEDURE — 93000 ELECTROCARDIOGRAM COMPLETE: CPT

## 2024-04-25 PROCEDURE — G2211 COMPLEX E/M VISIT ADD ON: CPT | Mod: NC,1L

## 2024-04-25 RX ORDER — ATORVASTATIN CALCIUM 80 MG/1
80 TABLET, FILM COATED ORAL DAILY
Qty: 90 | Refills: 3 | Status: ACTIVE | COMMUNITY
Start: 1900-01-01 | End: 1900-01-01

## 2024-04-25 RX ORDER — LISINOPRIL 2.5 MG/1
2.5 TABLET ORAL DAILY
Qty: 90 | Refills: 3 | Status: ACTIVE | COMMUNITY
Start: 1900-01-01 | End: 1900-01-01

## 2024-04-25 NOTE — HISTORY OF PRESENT ILLNESS
[FreeTextEntry1] : Jesse continues to exercise regularly. Turning 49 this year. No new meds. SOB on exertion now and weight same.

## 2024-04-25 NOTE — DISCUSSION/SUMMARY
[FreeTextEntry1] : The patient is a 48-year-old gentleman HLD, CAD, PVC s/p cholecystectomy with العلي.  #1 PVC- resolved #2 CAD - IWMI, no angina or HF, c/w aspirin and lisinopril, ECHO and nuclear stress ordered. #3 HLD- c/w atorvastatin 80mg, labs today #4 Prediabetes- labs today #5 General- We discussed adherence to a low fat, low cholesterol diet, losing weight and regular daily exercise. Received Moderna vaccines.   [EKG obtained to assist in diagnosis and management of assessed problem(s)] : EKG obtained to assist in diagnosis and management of assessed problem(s)

## 2024-04-25 NOTE — REVIEW OF SYSTEMS
[Weight Loss (___ Lbs)] : [unfilled] ~Ulb weight loss [Negative] : Heme/Lymph [SOB] : no shortness of breath [Dyspnea on exertion] : not dyspnea during exertion [Chest Discomfort] : no chest discomfort [Lower Ext Edema] : no extremity edema [Palpitations] : no palpitations [Leg Claudication] : no intermittent leg claudication [Syncope] : no syncope

## 2024-06-20 ENCOUNTER — RESULT REVIEW (OUTPATIENT)
Age: 49
End: 2024-06-20

## 2024-06-20 ENCOUNTER — OUTPATIENT (OUTPATIENT)
Dept: OUTPATIENT SERVICES | Facility: HOSPITAL | Age: 49
LOS: 1 days | End: 2024-06-20
Payer: COMMERCIAL

## 2024-06-20 ENCOUNTER — APPOINTMENT (OUTPATIENT)
Dept: CV DIAGNOSTICS | Facility: HOSPITAL | Age: 49
End: 2024-06-20

## 2024-06-20 ENCOUNTER — APPOINTMENT (OUTPATIENT)
Dept: CV DIAGNOSITCS | Facility: HOSPITAL | Age: 49
End: 2024-06-20

## 2024-06-20 DIAGNOSIS — E78.5 HYPERLIPIDEMIA, UNSPECIFIED: ICD-10-CM

## 2024-06-20 PROCEDURE — 93356 MYOCRD STRAIN IMG SPCKL TRCK: CPT

## 2024-06-20 PROCEDURE — 93017 CV STRESS TEST TRACING ONLY: CPT

## 2024-06-20 PROCEDURE — C8929: CPT

## 2024-06-20 PROCEDURE — 93018 CV STRESS TEST I&R ONLY: CPT | Mod: MC

## 2024-06-20 PROCEDURE — 78452 HT MUSCLE IMAGE SPECT MULT: CPT | Mod: MC

## 2024-06-20 PROCEDURE — 93016 CV STRESS TEST SUPVJ ONLY: CPT | Mod: MC

## 2024-06-20 PROCEDURE — 76376 3D RENDER W/INTRP POSTPROCES: CPT

## 2024-06-20 PROCEDURE — 76376 3D RENDER W/INTRP POSTPROCES: CPT | Mod: 26

## 2024-06-20 PROCEDURE — 78452 HT MUSCLE IMAGE SPECT MULT: CPT | Mod: 26,MC

## 2024-06-20 PROCEDURE — A9500: CPT

## 2024-06-20 PROCEDURE — 93306 TTE W/DOPPLER COMPLETE: CPT | Mod: 26

## 2024-06-24 DIAGNOSIS — R93.1 ABNORMAL FINDINGS ON DIAGNOSTIC IMAGING OF HEART AND CORONARY CIRCULATION: ICD-10-CM

## 2024-08-15 DIAGNOSIS — I25.10 ATHEROSCLEROTIC HEART DISEASE OF NATIVE CORONARY ARTERY W/OUT ANGINA PECTORIS: ICD-10-CM

## 2024-08-16 ENCOUNTER — TRANSCRIPTION ENCOUNTER (OUTPATIENT)
Age: 49
End: 2024-08-16

## 2024-10-22 ENCOUNTER — NON-APPOINTMENT (OUTPATIENT)
Age: 49
End: 2024-10-22

## 2024-10-22 ENCOUNTER — APPOINTMENT (OUTPATIENT)
Dept: CARDIOLOGY | Facility: CLINIC | Age: 49
End: 2024-10-22
Payer: COMMERCIAL

## 2024-10-22 VITALS
HEART RATE: 57 BPM | DIASTOLIC BLOOD PRESSURE: 87 MMHG | BODY MASS INDEX: 28.7 KG/M2 | SYSTOLIC BLOOD PRESSURE: 132 MMHG | WEIGHT: 205 LBS | HEIGHT: 71 IN | OXYGEN SATURATION: 97 %

## 2024-10-22 DIAGNOSIS — I25.10 ATHEROSCLEROTIC HEART DISEASE OF NATIVE CORONARY ARTERY W/OUT ANGINA PECTORIS: ICD-10-CM

## 2024-10-22 DIAGNOSIS — I49.3 VENTRICULAR PREMATURE DEPOLARIZATION: ICD-10-CM

## 2024-10-22 DIAGNOSIS — E78.5 HYPERLIPIDEMIA, UNSPECIFIED: ICD-10-CM

## 2024-10-22 PROCEDURE — 93000 ELECTROCARDIOGRAM COMPLETE: CPT

## 2024-10-22 PROCEDURE — 99214 OFFICE O/P EST MOD 30 MIN: CPT

## 2024-10-22 PROCEDURE — G2211 COMPLEX E/M VISIT ADD ON: CPT | Mod: NC

## 2025-04-22 ENCOUNTER — NON-APPOINTMENT (OUTPATIENT)
Age: 50
End: 2025-04-22

## 2025-04-22 ENCOUNTER — APPOINTMENT (OUTPATIENT)
Dept: CARDIOLOGY | Facility: CLINIC | Age: 50
End: 2025-04-22
Payer: COMMERCIAL

## 2025-04-22 VITALS
SYSTOLIC BLOOD PRESSURE: 124 MMHG | OXYGEN SATURATION: 97 % | HEART RATE: 54 BPM | BODY MASS INDEX: 29.4 KG/M2 | HEIGHT: 71 IN | DIASTOLIC BLOOD PRESSURE: 79 MMHG | WEIGHT: 210 LBS

## 2025-04-22 DIAGNOSIS — I25.10 ATHEROSCLEROTIC HEART DISEASE OF NATIVE CORONARY ARTERY W/OUT ANGINA PECTORIS: ICD-10-CM

## 2025-04-22 DIAGNOSIS — I49.3 VENTRICULAR PREMATURE DEPOLARIZATION: ICD-10-CM

## 2025-04-22 DIAGNOSIS — E78.5 HYPERLIPIDEMIA, UNSPECIFIED: ICD-10-CM

## 2025-04-22 LAB
25(OH)D3 SERPL-MCNC: 20.4 NG/ML
ALBUMIN SERPL ELPH-MCNC: 4.5 G/DL
ALP BLD-CCNC: 92 U/L
ALT SERPL-CCNC: 60 U/L
ANION GAP SERPL CALC-SCNC: 18 MMOL/L
AST SERPL-CCNC: 38 U/L
BILIRUB SERPL-MCNC: 1.1 MG/DL
BUN SERPL-MCNC: 13 MG/DL
CALCIUM SERPL-MCNC: 9 MG/DL
CHLORIDE SERPL-SCNC: 103 MMOL/L
CHOLEST SERPL-MCNC: 145 MG/DL
CO2 SERPL-SCNC: 22 MMOL/L
CREAT SERPL-MCNC: 0.89 MG/DL
EGFRCR SERPLBLD CKD-EPI 2021: 104 ML/MIN/1.73M2
ESTIMATED AVERAGE GLUCOSE: 123 MG/DL
GLUCOSE SERPL-MCNC: 75 MG/DL
HBA1C MFR BLD HPLC: 5.9 %
HCT VFR BLD CALC: 43.1 %
HDLC SERPL-MCNC: 37 MG/DL
HGB BLD-MCNC: 13.9 G/DL
LDLC SERPL-MCNC: 74 MG/DL
MCHC RBC-ENTMCNC: 27.6 PG
MCHC RBC-ENTMCNC: 32.3 G/DL
MCV RBC AUTO: 85.7 FL
NONHDLC SERPL-MCNC: 108 MG/DL
PLATELET # BLD AUTO: 169 K/UL
POTASSIUM SERPL-SCNC: 4.2 MMOL/L
PROT SERPL-MCNC: 7.2 G/DL
PSA SERPL-MCNC: 0.91 NG/ML
RBC # BLD: 5.03 M/UL
RBC # FLD: 14.2 %
SODIUM SERPL-SCNC: 143 MMOL/L
TRIGL SERPL-MCNC: 207 MG/DL
WBC # FLD AUTO: 6.45 K/UL

## 2025-04-22 PROCEDURE — G2211 COMPLEX E/M VISIT ADD ON: CPT | Mod: NC

## 2025-04-22 PROCEDURE — 99214 OFFICE O/P EST MOD 30 MIN: CPT

## 2025-04-22 PROCEDURE — 93000 ELECTROCARDIOGRAM COMPLETE: CPT

## 2025-04-23 RX ORDER — ROSUVASTATIN CALCIUM 40 MG/1
40 TABLET, FILM COATED ORAL
Qty: 90 | Refills: 3 | Status: ACTIVE | COMMUNITY
Start: 2025-04-23 | End: 1900-01-01

## (undated) DEVICE — PREP BETADINE KIT

## (undated) DEVICE — GLV 7.5 PROTEXIS

## (undated) DEVICE — DRSG MASTISOL

## (undated) DEVICE — PACK GENERAL LAPAROSCOPY

## (undated) DEVICE — NDL HYPO SAFE 18G X 1.5"

## (undated) DEVICE — TROCAR ETHICON ENDOPATH XCEL BLADELESS 5MM-100MM

## (undated) DEVICE — ENDO SHEARS COVIDIEN 5MM W UNIPOLAR CAUTERY

## (undated) DEVICE — DRAPE TOWEL BLUE 17" X 24"

## (undated) DEVICE — BLANKET WARMER UPPER ADULT

## (undated) DEVICE — DRSG STERISTRIPS 0.5X4"

## (undated) DEVICE — SOL IRR POUR H2O 1000ML

## (undated) DEVICE — BLADE SURGICAL #15 CARBON

## (undated) DEVICE — TUBING STRYKEFLOW II SUCTION / IRRIGATOR

## (undated) DEVICE — SOL IRR POUR NS 0.9% 1000ML

## (undated) DEVICE — DRAPE CHEST BREAST 106X122"

## (undated) DEVICE — GLV 8 PROTEXIS

## (undated) DEVICE — TROCAR COVIDIEN VERSAONE BLUNT TIP HASSAN 12MM

## (undated) DEVICE — SUT BIOSYN 4-0 18" P-12

## (undated) DEVICE — D HELP - CLEARVIEW CLEARIFY SYSTEM

## (undated) DEVICE — TROCAR ETHICON 11MM XCEL BLADELESS

## (undated) DEVICE — TROCAR ETHICON XCEL UNIVERSAL SLEEVE W OPTIVIEW 5MM-100MM

## (undated) DEVICE — SOL INJ NS 0.9% 1000ML

## (undated) DEVICE — DRAPE 3/4 SHEET 52X76"

## (undated) DEVICE — WRAP COMPRESSION CALF MED

## (undated) DEVICE — SUT DERMABOND 0.7ML

## (undated) DEVICE — SUT POLYSORB 0 30" GU-46

## (undated) DEVICE — ENDOCATCH 10MM SPECIMEN POUCH

## (undated) DEVICE — SPONGE ENDO PEANUT 5MM